# Patient Record
Sex: FEMALE | Race: WHITE | NOT HISPANIC OR LATINO | Employment: FULL TIME | URBAN - METROPOLITAN AREA
[De-identification: names, ages, dates, MRNs, and addresses within clinical notes are randomized per-mention and may not be internally consistent; named-entity substitution may affect disease eponyms.]

---

## 2021-10-10 ENCOUNTER — OCCUPATIONAL MEDICINE (OUTPATIENT)
Dept: URGENT CARE | Facility: CLINIC | Age: 61
End: 2021-10-10
Payer: COMMERCIAL

## 2021-10-10 ENCOUNTER — APPOINTMENT (OUTPATIENT)
Dept: RADIOLOGY | Facility: IMAGING CENTER | Age: 61
End: 2021-10-10
Attending: PHYSICIAN ASSISTANT
Payer: COMMERCIAL

## 2021-10-10 VITALS
WEIGHT: 103 LBS | TEMPERATURE: 98.2 F | SYSTOLIC BLOOD PRESSURE: 104 MMHG | BODY MASS INDEX: 17.58 KG/M2 | HEART RATE: 66 BPM | HEIGHT: 64 IN | OXYGEN SATURATION: 100 % | DIASTOLIC BLOOD PRESSURE: 56 MMHG | RESPIRATION RATE: 18 BRPM

## 2021-10-10 DIAGNOSIS — S62.639B OPEN FRACTURE OF TUFT OF DISTAL PHALANX OF FINGER: ICD-10-CM

## 2021-10-10 DIAGNOSIS — S69.91XA INJURY OF FINGER OF RIGHT HAND, INITIAL ENCOUNTER: ICD-10-CM

## 2021-10-10 DIAGNOSIS — S61.309A FINGERNAIL AVULSION, PARTIAL, INITIAL ENCOUNTER: ICD-10-CM

## 2021-10-10 DIAGNOSIS — S60.10XA SUBUNGUAL HEMATOMA OF FINGER OF RIGHT HAND, INITIAL ENCOUNTER: ICD-10-CM
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
## 2021-10-10 LAB
AMP AMPHETAMINE: NORMAL
BREATH ALCOHOL COMMENT: NORMAL
COC COCAINE: NORMAL
INT CON NEG: NORMAL
INT CON POS: NORMAL
MET METHAMPHETAMINES: NORMAL
OPI OPIATES: NORMAL
PCP PHENCYCLIDINE: NORMAL
POC BREATHALIZER: 0 PERCENT (ref 0–0.01)
POC DRUG COMMENT 753798-OCCUPATIONAL HEALTH: NEGATIVE
THC: NORMAL

## 2021-10-10 PROCEDURE — 82075 ASSAY OF BREATH ETHANOL: CPT | Mod: 29 | Performed by: PHYSICIAN ASSISTANT

## 2021-10-10 PROCEDURE — 99204 OFFICE O/P NEW MOD 45 MIN: CPT | Mod: 29 | Performed by: PHYSICIAN ASSISTANT

## 2021-10-10 PROCEDURE — 73140 X-RAY EXAM OF FINGER(S): CPT | Mod: TC,FY,RT | Performed by: PHYSICIAN ASSISTANT

## 2021-10-10 PROCEDURE — 80305 DRUG TEST PRSMV DIR OPT OBS: CPT | Mod: 29 | Performed by: PHYSICIAN ASSISTANT

## 2021-10-10 RX ORDER — AMOXICILLIN AND CLAVULANATE POTASSIUM 875; 125 MG/1; MG/1
1 TABLET, FILM COATED ORAL 2 TIMES DAILY
Qty: 14 TABLET | Refills: 0 | Status: SHIPPED | OUTPATIENT
Start: 2021-10-10 | End: 2021-10-17

## 2021-10-10 RX ORDER — ESTRADIOL 10 UG/1
TABLET VAGINAL
COMMUNITY
Start: 2021-09-28 | End: 2021-10-10

## 2021-10-10 RX ORDER — IBUPROFEN 200 MG
400 TABLET ORAL EVERY 6 HOURS PRN
COMMUNITY
End: 2021-12-08

## 2021-10-10 RX ORDER — ONDANSETRON 4 MG/1
TABLET, FILM COATED ORAL
COMMUNITY
Start: 2021-09-15 | End: 2021-10-10

## 2021-10-10 RX ORDER — FEXOFENADINE HCL 180 MG/1
180 TABLET ORAL DAILY
COMMUNITY
Start: 2021-03-05 | End: 2021-12-08

## 2021-10-10 RX ORDER — ESTRADIOL 0.1 MG/G
CREAM VAGINAL
COMMUNITY
Start: 2021-10-07 | End: 2021-12-08

## 2021-10-10 RX ORDER — MONTELUKAST SODIUM 10 MG/1
1 TABLET ORAL DAILY
COMMUNITY
Start: 2021-04-26 | End: 2021-10-10

## 2021-10-10 RX ORDER — OXYCODONE HYDROCHLORIDE 5 MG/1
TABLET ORAL
COMMUNITY
Start: 2021-09-15 | End: 2021-10-10

## 2021-10-10 RX ORDER — CONJUGATED ESTROGENS 0.62 MG/G
CREAM VAGINAL
COMMUNITY
Start: 2021-08-07 | End: 2021-10-10

## 2021-10-10 RX ORDER — ALBUTEROL SULFATE 90 UG/1
2 AEROSOL, METERED RESPIRATORY (INHALATION)
COMMUNITY
Start: 2021-03-05 | End: 2021-12-08

## 2021-10-10 ASSESSMENT — PAIN SCALES - GENERAL: PAINLEVEL: 5=MODERATE PAIN

## 2021-10-10 NOTE — PROGRESS NOTES
"Isabel Dumont is a 60 y.o. female who presents with Finger Injury (DOI: 10/10/2021, Rt middle finger, ring finger's nail is loose, finger got stuck with the door, swollen, bruised, painful)      DOI: 10/10/2021  60-year-old female presents to urgent care for evaluation injury to 3rd and 4th right side crushed in that occurred just prior to arrival at work.  She reports her fingers were crushed in a door.  Onset of pain, swelling, bruising to middle finger with small open wound to the side of her finger.  Patient's fingernail of fourth finger was partially avulsed.  Previous injury.  Patient is left-hand dominant.  She took 2 ibuprofen prior to arrival with some relief.     HPI    ROS           Objective     /56 (BP Location: Left arm, Patient Position: Sitting, BP Cuff Size: Adult)   Pulse 66   Temp 36.8 °C (98.2 °F) (Temporal)   Resp 18   Ht 1.626 m (5' 4\")   Wt 46.7 kg (103 lb)   SpO2 100%   Breastfeeding No   BMI 17.68 kg/m²      Physical Exam    Well-appearing and in no acute distress.  A & O by 4.  MSK: Right hand-swelling, bruising, and ecchymosis to distal third phalanges.  Less than 1 cm superficial laceration to lateral aspect of finger.  Subungual hematoma present seen under gel nail polish.   Fourth finger nail is partially avulsed with no bleeding.                   Assessment & Plan        1. Injury of finger of right hand, initial encounter  - DX-FINGER(S) 2+ RIGHT; Future    2. Open fracture of tuft of distal phalanx of finger  - amoxicillin-clavulanate (AUGMENTIN) 875-125 MG Tab; Take 1 Tablet by mouth 2 times a day for 7 days.  Dispense: 14 Tablet; Refill: 0    3. Subungual hematoma of finger of right hand, initial encounter    4. Fingernail avulsion, partial, initial encounter       -Limit use of right hand  -Wear finger splint while at work.  -Augmentin for prophylactic coverage of open fracture  -OTC ibuprofen for pain  -Rest, ice, elevate  -Monitor for signs of " infection  -Close follow-up in 2 days.  -Discussed return/ED precautions  -Patient verbalized understanding of treatment plan and has no further questions regarding care.

## 2021-10-10 NOTE — LETTER
"EMPLOYEE’S CLAIM FOR COMPENSATION/ REPORT OF INITIAL TREATMENT  FORM C-4    EMPLOYEE’S CLAIM - PROVIDE ALL INFORMATION REQUESTED   First Name  Valerie Last Name  Tim Birthdate                    1960                Sex  female Claim Number (Insurer’s Use Only)   Home Address  [address] Age  60 y.o. 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 (if applicable)  was following [employer] at glass door near gym&finger got caught on the door    How did this injury or occupational disease occur? (Be specific an answer in detail. Use additional sheet if necessary)  got my finger stuck in door   If you believe that you have an occupational disease, when did you first have knowledge of the disability and it relationship to your employment?  n/a Witnesses to the Accident  Security      Nature of Injury or Occupational Disease  Puncture  Part(s) of Body Injured or Affected  Finger (R), N/A, N/A    I certify that the above is true and correct to the best of my knowledge and that I have provided this " information in order to obtain the benefits of [place]  I hereby authorize any physician, chiropractor, surgeon, practitioner, or other person, any hospital, including [facility], any medical service organization, any insurance company, or other institution or organization to release to each other, any medical or other information, including benefits paid or payable, pertinent to this injury or disease, except information relative to diagnosis, treatment and/or counseling for AIDS, psychological conditions, alcohol or controlled substances, for which I must give specific authorization.  A Photostat of this authorization shall be as valid as the original.     Date 10/10/21   [facility]’s Original or  *Electronic Signature   THIS REPORT MUST BE COMPLETED AND MAILED WITHIN 3 WORKING DAYS OF TREATMENT   Place  [facility]  Name of Facility  [facility]   Date  10/10/2021 Diagnosis and Description of Injury or Occupational Disease  (S69.91XA) Injury of finger of right hand, initial encounter  (S62.639B) Open fracture of tuft of distal phalanx of finger  (S60.10XA) Subungual hematoma of finger of right hand, initial encounter  (S61.309A) Fingernail avulsion, partial, initial encounter Is there evidence the injured employee was under the influence of alcohol and/or another controlled substance at the time of accident?  ? No ? Yes (if yes, please explain)   Hour  11:03 AM   Diagnoses of Injury of finger of right hand, initial encounter, Open fracture of tuft of distal phalanx of finger, Subungual hematoma of finger of right hand, initial encounter, and Fingernail avulsion, partial, initial encounter were pertinent to this visit. No   Treatment  Exam, x-ray, evacuation of subungual hematoma, dressing, finger splint.  Have you advised the patient to remain  off work five days or     more?    X-Ray Findings  Positive  Comments:Fracture of distal third phalangeal tuft   ? Yes Indicate dates:   From   To      From information given by the employee, together with medical evidence, can        you directly connect this injury or occupational disease as job incurred?  Yes ? No If no, is the injured employee capable of:  ? full duty  No ? modified duty  Yes   Is additional medical care by a physician indicated?  Yes If Modified Duty, Specify any Limitations / Restrictions  See D 39   Do you know of any previous injury or disease contributing to this condition or occupational disease?  ? Yes ? No (Explain if yes)                          No   Date  10/10/2021 Print Health Care Provider's   Stefani Haddad P.A.-C. I certify the employer’s copy of  this form was mailed on:   [address] Insurer’s Use Only     [facility] Zip  [address]    Provider’s Tax ID Number  [id number] Telephone  Dept: [phone]             Health Care Provider’s Original or Electronic Signature  e-SignSTEFANI HADDAD P.A.-C. Degree (MD,DO, DC,PADavidC,APRN)  {Provider Degrees:29481}      * Complete and attach Release of Information (Form C-4A) when injured employee signs C-4 Form electronically  ORIGINAL - TREATING HEALTHCARE PROVIDER PAGE 2 - INSURER/TPA PAGE 3 - EMPLOYER PAGE 4 - EMPLOYEE             Form C-4 (rev.08/21)           BRIEF DESCRIPTION OF RIGHTS AND BENEFITS  (Pursuant to [place]RS 616C.050)    Notice of Injury or Occupational Disease (Incident Report Form C-1): If an injury or occupational disease (OD) arises out of and in the course of employment, you must provide written notice to your employer as soon as practicable, but no later than 7 days after the accident or OD. Your employer shall maintain a sufficient supply of the required forms.    Claim for Compensation (Form C-4): If medical treatment is sought, the form C-4 is available at the place of  "initial treatment. A completed \"Claim for Compensation\" (Form C-4) must be filed within 90 days after an accident or OD. The treating physician or chiropractor must, within 3 working days after treatment, complete and mail to the employer, the employer's insurer and third-party , the Claim for Compensation.    Medical Treatment: If you require medical treatment for your on-the-job injury or OD, you may be required to select a physician or chiropractor from a list provided by your workers’ compensation insurer, if it has contracted with an Organization for Managed Care (MCO) or Preferred Provider Organization (PPO) or providers of health care. If your employer has not entered into a contract with an MCO or PPO, you may select a physician or chiropractor from the Panel of Physicians and Chiropractors. Any medical costs related to your industrial injury or OD will be paid by your insurer.    Temporary Total Disability (TTD): If your doctor has certified that you are unable to work for a period of at least 5 consecutive days, or 5 cumulative days in a 20-day period, or places restrictions on you that your employer does not accommodate, you may be entitled to TTD compensation.    Temporary Partial Disability (TPD): If the wage you receive upon reemployment is less than the compensation for TTD to which you are entitled, the insurer may be required to pay you TPD compensation to make up the difference. TPD can only be paid for a maximum of 24 months.    Permanent Partial Disability (PPD): When your medical condition is stable and there is an indication of a PPD as a result of your injury or OD, within 30 days, your insurer must arrange for an evaluation by a rating physician or chiropractor to determine the degree of your PPD. The amount of your PPD award depends on the date of injury, the results of the PPD evaluation, your age and wage.    Permanent Total Disability (PTD): If you are medically certified by " a treating physician or chiropractor as permanently and totally disabled and have been granted a PTD status by your insurer, you are entitled to receive monthly benefits not to exceed 66 2/3% of your average monthly wage. The amount of your PTD payments is subject to reduction if you previously received a lump-sum PPD award.    Vocational Rehabilitation Services: You may be eligible for vocational rehabilitation services if you are unable to return to the job due to a permanent physical impairment or permanent restrictions as a result of your injury or occupational disease.    Transportation and Per Ridge Reimbursement: You may be eligible for travel expenses and per ridge associated with medical treatment.    Reopening: You may be able to reopen your claim if your condition worsens after claim closure.     Appeal Process: If you disagree with a written determination issued by the insurer or the insurer does not respond to your request, you may appeal to the Department of Administration, , by following the instructions contained in your determination letter. You must appeal the determination within 70 days from the date of the determination letter at [address], [address], [place] [address], or [address], [place] [address]. If you disagree with the  decision, you may appeal to the Department of Administration, . You must file your appeal within 30 days from the date of the  decision letter at [address], [address], [place] [address], or [address], [place] [address]. If you disagree with a decision of an , you may file a petition for judicial review with the District Court. You must do so within 30 days of the Appeal Officer’s decision. You may be represented by an  at your own expense or you may contact the [facility] for possible  representation.    [place]  for Injured Workers (NAIW): If you disagree with a  decision, you may request that NAIW represent you without charge at an  Hearing. For information regarding denial of benefits, you may contact the NAIW at: [address], [address], [place] [address], [phone], or [address], [address], [place] [address], [phone]    To File a Complaint with the Division: If you wish to file a complaint with the  of the Division of Industrial Relations (DIR),  please contact the Workers’ Compensation Section, [address], [address], [place], [place] [address], telephone [phone], or [address], [address], [place], [place] [address], telephone [phone].    For assistance with Workers’ Compensation Issues: You may contact the [facility] Office for Consumer Health Assistance, [address], [address], [place], [place] [address], Toll Free 1-760.714.6361, Web site: http://[facility].[place].gov/Programs/KURT E-mail: [email or web address]              __________________________________________________________________                                    _________________            Employee Name / Signature                                                                                                                            Date                                                                                                                                                                                                                              D-2 (rev. 10/20)

## 2021-10-10 NOTE — LETTER
[facility]  [address] [place] [address]  Phone:  [phone] - Fax:  [phone]   [facility] Progress Report and Disability Certification  Date of Service: 10/10/2021   No Show:  No  Date / Time of Next Visit: 10/12/2021   Claim Information   Patient Name: Valerie Dumont  Claim Number:     Employer:   [employer] Date of Injury: 10/10/2021     Insurer / TPA:   [employer] ID / SSN:     Occupation:   Diagnosis: Diagnoses of Injury of finger of right hand, initial encounter, Open fracture of tuft of distal phalanx of finger, Subungual hematoma of finger of right hand, initial encounter, and Fingernail avulsion, partial, initial encounter were pertinent to this visit.    Medical Information   Related to Industrial Injury? Yes    Subjective Complaints:  DOI: 10/10/2021  60-year-old female presents to urgent care for evaluation injury to 3rd and 4th right side crushed in that occurred just prior to arrival at work.  She reports her fingers were crushed in a door.  Onset of pain, swelling, bruising to middle finger with small open wound to the side of her finger.  Patient's fingernail of fourth finger was partially avulsed.  Previous injury.  Patient is left-hand dominant.  She took 2 ibuprofen prior to arrival with some relief.   Objective Findings: Well-appearing and in no acute distress.  A & O by 4.  MSK: Right hand-swelling, bruising, and ecchymosis to distal third phalanges.  Less than 1 cm superficial laceration to lateral aspect of finger.  Subungual hematoma present seen under gel nail polish.   Fourth finger nail is partially avulsed with no bleeding.   Pre-Existing Condition(s):     Assessment:   Initial Visit    Status: Additional Care Required  Permanent Disability:No    Plan: Medication  Comments:Augmentin, OTC ibuprofen    Diagnostics: X-ray  Comments:Nondisplaced fracture to distal tuft of third right finger    Comments:   Procedure: Evacuation Subungual Hematoma  -risks, benefits, and alternatives to procedure discussed  -clean technique using sterile instruments  -1% lidocaine to anesthetize finger  -Cautery used to bore a hole in the nail with, no release of blood,   improvement in pain  -patient tolerated well      Disability Information   Status: Released to Restricted Duty    From:  10/10/2021  Through: 10/12/2021 Restrictions are: Temporary   Physical Restrictions   Sitting:    Standing:    Stooping:    Bending:      Squatting:    Walking:    Climbing:    Pushing:      Pulling:    Other:    Reaching Above Shoulder (L):   Reaching Above Shoulder (R):       Reaching Below Shoulder (L):    Reaching Below Shoulder (R):      Not to exceed Weight Limits   Carrying(hrs):   Weight Limit(lb):   Lifting(hrs):   Weight  Limit(lb):     Comments: -Limit use of right hand  -Wear finger splint while at work.  -Augmentin for prophylactic coverage of open fracture  -OTC ibuprofen for pain  -Rest, ice, elevate  -Monitor for signs of infection  -Close follow-up in 2 days.  -Discussed return/ED precautions  -Patient verbalized understanding of treatment plan and has no further questions regarding care.     Repetitive Actions   Hands: i.e. Fine Manipulations from Grasping:     Feet: i.e. Operating Foot Controls:     Driving / Operate Machinery:     Health Care Provider’s Original or Electronic Signature  SILVIANO Day.CHIN. Health Care Provider’s Original or Electronic Signature    Charlie Lugo MD         Clinic Name / Location: [address] [place] [address] Clinic Phone Number: Dept: [phone]   Appointment Time: 11:00 Am Visit Start Time: 11:03 AM   Check-In Time:  11:00 Am Visit Discharge Time:  ***   Original-Treating Physician or Chiropractor    Page 2-Insurer/TPA    Page 3-Employer    Page 4-Employee

## 2021-10-12 ENCOUNTER — OCCUPATIONAL MEDICINE (OUTPATIENT)
Dept: URGENT CARE | Facility: CLINIC | Age: 61
End: 2021-10-12
Payer: COMMERCIAL

## 2021-10-12 VITALS
HEART RATE: 56 BPM | RESPIRATION RATE: 18 BRPM | OXYGEN SATURATION: 97 % | SYSTOLIC BLOOD PRESSURE: 96 MMHG | TEMPERATURE: 99.2 F | WEIGHT: 105 LBS | BODY MASS INDEX: 17.93 KG/M2 | HEIGHT: 64 IN | DIASTOLIC BLOOD PRESSURE: 54 MMHG

## 2021-10-12 DIAGNOSIS — S60.10XD SUBUNGUAL HEMATOMA OF FINGERNAIL, SUBSEQUENT ENCOUNTER: ICD-10-CM

## 2021-10-12 DIAGNOSIS — S62.639B OPEN FRACTURE OF TUFT OF DISTAL PHALANX OF FINGER: ICD-10-CM

## 2021-10-12 PROCEDURE — 99214 OFFICE O/P EST MOD 30 MIN: CPT | Performed by: PHYSICIAN ASSISTANT

## 2021-10-12 ASSESSMENT — ENCOUNTER SYMPTOMS
TINGLING: 1
SENSORY CHANGE: 0
JOINT SWELLING: 1

## 2021-10-12 NOTE — PROGRESS NOTES
"Isabel Dumont is a 60 y.o. female who presents with Follow-Up (R 3rd finger )      DOI: 10/10/21-patient returns today continuing to take Augmentin along with ibuprofen-she also reports she is compliant with her finger splint.  Yesterday she reports that her finger was\" so much pain \"and bleeding more than usual of which she was unable to work.  She continues to report pain and throbbing to the distal portion of the fingernail. Denies any new numbness or tingling.   Copied from original visit- 60-year-old female presents to urgent care for evaluation injury to 3rd and 4th right side crushed in that occurred just prior to arrival at work.  She reports her fingers were crushed in a door.  Onset of pain, swelling, bruising to middle finger with small open wound to the side of her finger.        Hand Injury  This is a new (This provider) problem. Episode onset: 10/10. The problem occurs constantly. The problem has been unchanged. Associated symptoms include joint swelling. Exacerbated by: Pressure over the finger. Treatments tried: As above.       Review of Systems   Musculoskeletal: Positive for joint pain and joint swelling.   Neurological: Positive for tingling. Negative for sensory change.   All other systems reviewed and are negative.             Objective     BP (!) 96/54   Pulse (!) 56   Temp 37.3 °C (99.2 °F) (Temporal)   Resp 18   Ht 1.626 m (5' 4\")   Wt 47.6 kg (105 lb)   SpO2 97%   Breastfeeding No   BMI 18.02 kg/m²      PMH: No pertinent past medical history to this problem  MEDS: Medications were reviewed in Epic  ALLERGIES: Allergies were reviewed in Epic  SOCHX: Works as a  for small  FH: No pertinent family history to this problem    Physical Exam  Vitals reviewed.   Constitutional:       General: She is not in acute distress.     Appearance: She is well-developed.   HENT:      Head: Normocephalic and atraumatic.   Eyes:      Conjunctiva/sclera: Conjunctivae normal.      " Pupils: Pupils are equal, round, and reactive to light.   Neck:      Trachea: No tracheal deviation.   Cardiovascular:      Rate and Rhythm: Normal rate.   Pulmonary:      Effort: Pulmonary effort is normal.   Musculoskeletal:      Cervical back: Normal range of motion and neck supple.   Skin:     General: Skin is warm.      Comments: No rash to area exposed during the visit today.    Neurological:      Mental Status: She is alert and oriented to person, place, and time.   Psychiatric:         Behavior: Behavior normal.         Thought Content: Thought content normal.         Judgment: Judgment normal.         Right hand: Third digit-radial aspect of the distal portion of the digit with small scab noted with dried blood diffuse ecchymosis along with erythema to the distal tip palmar aspect with significant tenderness.  Gel is on top of current fingernail-small portion of fingernail is revealed indicating subungual hematoma.                   Assessment & Plan        1. Open fracture of tuft of distal phalanx of finger  2. Subungual hematoma of fingernail, subsequent encounter           Continue with light duty, complete duration of Augmentin and utilization of ibuprofen.  Recheck in 1 week or sooner for worsening symptoms.  Please see D 39 is oh thoughtfor more information.     Please note that this dictation was created using voice recognition software. I have made every reasonable attempt to correct obvious errors, but I expect that there are errors of grammar and possibly content that I did not discover before finalizing the note.

## 2021-10-12 NOTE — LETTER
"   [facility]  [address] - [place] [address]  Phone:  [phone] - Fax:  [phone]   [facility] Progress Report and Disability Certification  Date of Service: 10/12/2021   No Show:  No  Date / Time of Next Visit: 10/19/2021   Claim Information   Patient Name: Valerie Dumont  Claim Number:     Employer:   [employer] Date of Injury: 10/10/2021     Insurer / TPA:   [employer] ID / SSN:     Occupation:   Diagnosis: Diagnoses of Open fracture of tuft of distal phalanx of finger and Subungual hematoma of fingernail, subsequent encounter were pertinent to this visit.    Medical Information   Related to Industrial Injury? Yes    Subjective Complaints:  DOI: 10/10/21-patient returns today continuing to take Augmentin along with ibuprofen-she also reports she is compliant with her finger splint.  Yesterday she reports that her finger was\" so much pain \"and bleeding more than usual of which she was unable to work.  She continues to report pain and throbbing to the distal portion of the fingernail. Denies any new numbness or tingling.   Copied from original visit- 60-year-old female presents to urgent care for evaluation injury to 3rd and 4th right side crushed in that occurred just prior to arrival at work.  She reports her fingers were crushed in a door.  Onset of pain, swelling, bruising to middle finger with small open wound to the side of her finger.      Objective Findings: Right hand: Third digit-radial aspect of the distal portion of the digit with small scab noted with dried blood diffuse ecchymosis along with erythema to the distal tip palmar aspect with significant tenderness.  Gel is on top of current fingernail-small portion of fingernail is revealed indicating subungual hematoma.   Pre-Existing Condition(s):     Assessment:   Condition Same    Status: Additional Care Required  Permanent Disability:No    Plan: " Medication  Comments:Augmentin/Ibuprofen.     Diagnostics: X-ray  Comments:Finger x-ray: Tuft fracture noted.    Comments:       Disability Information   Status: Released to Restricted Duty    From:  10/12/2021  Through: 10/19/2021 Restrictions are: Temporary   Physical Restrictions   Sitting:    Standing:    Stooping:    Bending:      Squatting:    Walking:    Climbing:    Pushin hrs/day   Pullin hrs/day Other:    Reaching Above Shoulder (L):   Reaching Above Shoulder (R):       Reaching Below Shoulder (L):    Reaching Below Shoulder (R):      Not to exceed Weight Limits   Carrying(hrs):   Weight Limit(lb): < or = to 10 pounds Lifting(hrs):   Weight  Limit(lb): < or = to 10 pounds   Comments: Patient is to continue to wear splint at work, limitations on pushing, pulling and lifting.  She is to continue to keep the area clean and dry.  Continue with Augmentin along with over-the-counter NSAIDs.  Return to clinic in 1 week for recheck or sooner for worsening symptoms.    Repetitive Actions   Hands: i.e. Fine Manipulations from Grasping:     Feet: i.e. Operating Foot Controls:     Driving / Operate Machinery:     Health Care Provider’s Original or Electronic Signature  ZHAO GilmoreAROSENDO. Health Care Provider’s Original or Electronic Signature    Charlie Lugo MD         Clinic Name / Location: [address] [place] [address] Clinic Phone Number: Dept: [phone]   Appointment Time: 8:00 Am Visit Start Time: 8:16 AM   Check-In Time:  8:07 Am Visit Discharge Time:  8:44 AM   Original-Treating Physician or Chiropractor    Page 2-Insurer/TPA    Page 3-Employer    Page 4-Employee

## 2021-10-12 NOTE — LETTER
October 12, 2021         Patient: Valerie Dumont   YOB: 1960   Date of Visit: 10/12/2021           To Whom it May Concern:    Valerie Dumont was seen in my clinic on 10/12/2021. Please excuse this patient from work yesterday due to increased pain and bleeding from wound.  If you have any questions or concerns, please don't hesitate to call.        Sincerely,           Valentín Wheeler P.A.-C.  Electronically Signed

## 2021-10-20 ENCOUNTER — OCCUPATIONAL MEDICINE (OUTPATIENT)
Dept: URGENT CARE | Facility: CLINIC | Age: 61
End: 2021-10-20
Payer: COMMERCIAL

## 2021-10-20 VITALS
WEIGHT: 105 LBS | OXYGEN SATURATION: 99 % | HEART RATE: 62 BPM | HEIGHT: 64 IN | RESPIRATION RATE: 12 BRPM | BODY MASS INDEX: 17.93 KG/M2 | TEMPERATURE: 98.1 F | DIASTOLIC BLOOD PRESSURE: 58 MMHG | SYSTOLIC BLOOD PRESSURE: 98 MMHG

## 2021-10-20 DIAGNOSIS — S62.662D CLOSED NONDISPLACED FRACTURE OF DISTAL PHALANX OF RIGHT MIDDLE FINGER WITH ROUTINE HEALING, SUBSEQUENT ENCOUNTER: ICD-10-CM

## 2021-10-20 DIAGNOSIS — S60.10XD SUBUNGUAL HEMATOMA OF FINGERNAIL, SUBSEQUENT ENCOUNTER: ICD-10-CM
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
## 2021-10-20 PROCEDURE — 99214 OFFICE O/P EST MOD 30 MIN: CPT | Performed by: NURSE PRACTITIONER

## 2021-10-20 RX ORDER — CLOTRIMAZOLE 1 G/ML
SOLUTION TOPICAL
COMMUNITY
Start: 2021-10-15 | End: 2021-12-08

## 2021-10-20 ASSESSMENT — ENCOUNTER SYMPTOMS
FEVER: 0
HEADACHES: 0
NAUSEA: 0
CHILLS: 0

## 2021-10-20 ASSESSMENT — LIFESTYLE VARIABLES: SUBSTANCE_ABUSE: 0

## 2021-10-20 NOTE — LETTER
[facility] [address] [place] [address]  Phone:  [phone] - Fax:  [phone]   [facility] Progress Report and Disability Certification  Date of Service: 10/20/2021   No Show:  No  Date / Time of Next Visit: 11/3/2021   Claim Information   Patient Name: Valerie Dumont  Claim Number:     Employer:   [employer] Date of Injury: 10/10/2021     Insurer / TPA: [employer] ID / SSN:     Occupation:   Diagnosis: There were no encounter diagnoses.    Medical Information   Related to Industrial Injury? Yes    Subjective Complaints:  DOI 10/10/2021 @ 10:05   Today still feeling much pressure under her nail. It is swelling up causing pressure, throbbing pain at time. She wears splint prn pain. Working at 95% duty without difficulty. Has not had to take medication for pain. Still has limited ROM to finger.  No other aggravating or alleviating factors.      Objective Findings: VSS. Limited ROM to right 3rd finger. + subungual hematoma of nail with appearance of increased pressure at base of nailbed. Drainage of hematoma done. Pt tolerated well.  Dressing applied.      Pre-Existing Condition(s):     Assessment:   Condition Improved    Status: Additional Care Required  Permanent Disability:No    Plan:      Diagnostics:      Comments:       Disability Information   Status:      From:  10/20/2021  Through: 11/3/2021 Restrictions are: Temporary   Physical Restrictions   Sitting:    Standing:    Stooping:    Bending:      Squatting:    Walking:    Climbing:    Pushing:      Pulling:    Other:    Reaching Above Shoulder (L):   Reaching Above Shoulder (R):       Reaching Below Shoulder (L):    Reaching Below Shoulder (R):      Not to exceed Weight Limits   Carrying(hrs):   Weight Limit(lb):   Lifting(hrs):   Weight  Limit(lb):     Comments: Finger splint prn   Limited activity if it involves right 3rd finger.   OTC  analgesic of choice (acetaminophen  or NSAID). Follow manufactures dosing and safety precautions.       Repetitive Actions   Hands: i.e. Fine Manipulations from Grasping:     Feet: i.e. Operating Foot Controls:     Driving / Operate Machinery:     Health Care Provider’s Original or Electronic Signature  ALLY Nassar. Health Care Provider’s Original or Electronic Signature    Charlie Lugo MD         Clinic Name / Location: [address]  [place] [address] Clinic Phone Number: Dept: [phone]   Appointment Time: 8:45 Am Visit Start Time: 8:35 AM   Check-In Time:  8:23 Am Visit Discharge Time:  9:14 AM   Original-Treating Physician or Chiropractor    Page 2-Insurer/TPA    Page 3-Employer    Page 4-Employee

## 2021-10-20 NOTE — PROGRESS NOTES
"Isabel Dumont is a 60 y.o. female who presents with Follow-Up (R 3rd finger injury, WC F/V )           HPI DOI 10/10/2021 @ 10:05   Today still feeling much pressure under her nail. It is swelling up causing pressure, throbbing pain at time. She wears splint prn pain. Working at 95% duty without difficulty. Has not had to take medication for pain. Still has limited ROM to finger.  No other aggravating or alleviating factors.     Review of Systems   Constitutional: Negative for chills and fever.   Gastrointestinal: Negative for nausea.   Musculoskeletal: Positive for joint pain.   Neurological: Negative for headaches.   Endo/Heme/Allergies: Negative for environmental allergies.   Psychiatric/Behavioral: Negative for substance abuse.              Objective     BP (!) 98/58   Pulse 62   Temp 36.7 °C (98.1 °F) (Temporal)   Resp 12   Ht 1.626 m (5' 4\")   Wt 47.6 kg (105 lb)   SpO2 99%   BMI 18.02 kg/m²      Physical Exam  Vitals and nursing note reviewed.   Constitutional:       General: She is not in acute distress.     Appearance: Normal appearance. She is normal weight.   Cardiovascular:      Rate and Rhythm: Normal rate.      Pulses: Normal pulses.   Skin:     General: Skin is warm.      Capillary Refill: Capillary refill takes less than 2 seconds.   Neurological:      Mental Status: She is alert and oriented to person, place, and time.   Psychiatric:         Mood and Affect: Mood normal.         Behavior: Behavior normal.         Thought Content: Thought content normal.         VSS. Limited ROM to right 3rd finger. + subungual hematoma of nail with appearance of increased pressure at base of nailbed. Drainage of hematoma done. Pt tolerated well.  Dressing applied.        Procedure: Needle Incision and Drainage   -Risks, benefits, and alternatives discussed. Risks including infection, bleeding, nerve damage, and poor cosmetic outcome. Informed consent obtained.    -Sterile technique throughout "   -The area was prepped in the usual manner  -Incision with 18 ga needle  With bloody material expressed   -Irrigated copiously with NS   -Minimal bleeding with good hemostasis achieved   -The patient tolerated the procedure well               Xray: Reviewed  From previous visit and interpreted independently by me. I agree with the radiologist's findings.        Assessment & Plan        1. Closed nondisplaced fracture of distal phalanx of right middle finger with routine healing, subsequent encounter     2. Subungual hematoma of fingernail, subsequent encounter     3. Work related injury         See [place] D39    FU 2 weeks     Consider xray at next visit to see routine healing   Drainage of subungual hematoma.

## 2021-11-04 ENCOUNTER — OCCUPATIONAL MEDICINE (OUTPATIENT)
Dept: URGENT CARE | Facility: CLINIC | Age: 61
End: 2021-11-04
Payer: COMMERCIAL

## 2021-11-04 VITALS
OXYGEN SATURATION: 98 % | RESPIRATION RATE: 18 BRPM | WEIGHT: 105 LBS | HEART RATE: 88 BPM | HEIGHT: 64 IN | TEMPERATURE: 98.2 F | SYSTOLIC BLOOD PRESSURE: 104 MMHG | BODY MASS INDEX: 17.93 KG/M2 | DIASTOLIC BLOOD PRESSURE: 66 MMHG

## 2021-11-04 DIAGNOSIS — S62.662D CLOSED NONDISPLACED FRACTURE OF DISTAL PHALANX OF RIGHT MIDDLE FINGER WITH ROUTINE HEALING, SUBSEQUENT ENCOUNTER: ICD-10-CM

## 2021-11-04 DIAGNOSIS — S60.10XD SUBUNGUAL HEMATOMA OF FINGERNAIL, SUBSEQUENT ENCOUNTER: ICD-10-CM

## 2021-11-04 DIAGNOSIS — L03.011 PARONYCHIA OF RIGHT MIDDLE FINGER: ICD-10-CM

## 2021-11-04 PROCEDURE — 99214 OFFICE O/P EST MOD 30 MIN: CPT | Performed by: PHYSICIAN ASSISTANT

## 2021-11-04 RX ORDER — CEPHALEXIN 500 MG/1
500 CAPSULE ORAL 4 TIMES DAILY
Qty: 28 CAPSULE | Refills: 0 | Status: SHIPPED | OUTPATIENT
Start: 2021-11-04 | End: 2021-11-11

## 2021-11-04 ASSESSMENT — ENCOUNTER SYMPTOMS
VOMITING: 0
FEVER: 0
NAUSEA: 0
EYE DISCHARGE: 0
EYE REDNESS: 0

## 2021-11-04 NOTE — PROGRESS NOTES
"Isabel Dumont is a 61 y.o. female who presents with Finger Injury (RIGHT 3rd finger, now painful, swollen, \"pulsing\" )            HPI  The patient presents to clinic for Workmen's Comp. follow-up.    DOI: 10/10/2021 -copied from original visit- \"60-year-old female presents to urgent care for evaluation injury to 3rd and 4th right side crushed in that occurred just prior to arrival at work.  She reports her fingers were crushed in a door.  Onset of pain, swelling, bruising to middle finger with small open wound to the side of her finger.  Patient's fingernail of fourth finger was partially avulsed.  Previous injury.  Patient is left-hand dominant.  She took 2 ibuprofen prior to arrival with some relief.\"    Follow-up #4 - Today, the patient reports increasing pain to her right third finger.  The patient states over the past 2-3 days she has developed increasing pain to her right third finger, which she describes as throbbing/pulsating.  The patient also reports associated swelling and redness.  The patient notes intermittent bleeding from the nail bed.  She reports no additional discharge/drainage.  No fever.  The patient states she has continued to wear her splint as directed.  The patient admits that they are short staffed at work, and that she has been doing more with her right hand.  The patient also admits that she has gotten her injury wet several times.  The patient continues to take OTC ibuprofen.    PMH: Reviewed in Epic, no pertinent findings.  MEDS: Reviewed in Epic.  ALLERGIES: Reviewed in Epic.  SURGHX: Reviewed in Epic.  SOCHX: Reviewed in Epic.  FH: Family history was reviewed, no pertinent findings to report.        Review of Systems   Constitutional: Negative for fever.   HENT: Negative for congestion.    Eyes: Negative for discharge and redness.   Gastrointestinal: Negative for nausea and vomiting.   Musculoskeletal: Positive for joint pain.        + injury to right 3rd digit         " "      Objective     /66   Pulse 88   Temp 36.8 °C (98.2 °F) (Temporal)   Resp 18   Ht 1.626 m (5' 4\")   Wt 47.6 kg (105 lb)   SpO2 98%   BMI 18.02 kg/m²      Physical Exam  Constitutional:       General: She is not in acute distress.     Appearance: Normal appearance. She is well-developed. She is not ill-appearing.   HENT:      Head: Normocephalic and atraumatic.      Right Ear: External ear normal.      Left Ear: External ear normal.   Eyes:      Extraocular Movements: Extraocular movements intact.      Conjunctiva/sclera: Conjunctivae normal.   Cardiovascular:      Rate and Rhythm: Normal rate.   Pulmonary:      Effort: Pulmonary effort is normal.   Musculoskeletal:      Cervical back: Normal range of motion and neck supple.      Comments:   Right 3rd Digit:  Tenderness to the distal aspect of the right third digit with associated swelling, overlying erythema, and increased warmth.  No active discharge/drainage.   A subungual hematoma is present to the fingernail of the right third digit.  No active bleeding.  No palpable fluctuance to the nail folds.  Decreased ROM -the patient demonstrates limited range of motion of the right third digit  Neurovascular intact distally  Strength 5/5 -flexion/extension of the right third digit against resistance   Skin:     General: Skin is warm and dry.   Neurological:      Mental Status: She is alert and oriented to person, place, and time.                             Assessment & Plan          1. Closed nondisplaced fracture of distal phalanx of right middle finger with routine healing, subsequent encounter  - Referral to Occupational Medicine  - Referral to Sports Medicine    2. Subungual hematoma of fingernail, subsequent encounter  - cephALEXin (KEFLEX) 500 MG Cap; Take 1 Capsule by mouth 4 times a day for 7 days.  Dispense: 28 Capsule; Refill: 0    3. Paronychia of right middle finger  - cephALEXin (KEFLEX) 500 MG Cap; Take 1 Capsule by mouth 4 times a day for " 7 days.  Dispense: 28 Capsule; Refill: 0    The patient's presenting symptoms and physical exam findings are consistent with a subacute injury injury to the right third digit resulting in a nondisplaced fracture of the distal phalanx of the right middle finger and a subungual hematoma.  The patient appears to have developed a secondary infection.  Will prescribe the patient Keflex for her acute paronychia.  Advised patient to monitor worsening signs and/or symptoms.  Placed referral to occupational medicine and sports medicine for further evaluation management.  Recommend OTC medications and supportive care for symptomatic management.  Discussed return precautions with the patient, and she verbalized understanding.    Differential diagnoses, supportive care, and indications for immediate follow-up discussed with patient.   Instructed to return to clinic or nearest emergency department for any change in condition, further concerns, or worsening of symptoms.    Plan:  Light Duty Work Restrictions - D39 provided   Keflex as prescribed   OTC NSAIDs for pain/discomfort   RICE  Keep finger clean and dry  Wear splint for additional support/stabilization  Referral to Occupational Medicine   Referral to Sports Medicine   Follow-up with PCP   Return to clinic or go tot he ED if symptoms worsen or fail to improve, or if the patient should develop worsening/increasing pain/tenderness, swelling, bruising, redness or warmth to the affected area, decreased ROM, numbness, tingling or weakness, difficulty walking, fever/chills, and/or any concerning symptoms.     Discussed plan with the patient, and she agrees to the above.     I personally reviewed prior external notes and test results pertinent to today's visit.  I have independently reviewed and interpreted all diagnostics ordered during this urgent care visit.     Time spent evaluating this patient was at least 30 minutes and includes preparing for visit, obtaining history, exam  and evaluation, ordering labs/tests/procedures/medications, independent interpretation, and counseling/education.    Please note that this dictation was created using voice recognition software. I have made every reasonable attempt to correct obvious errors, but I expect that there may be errors of grammar and possibly content that I did not discover before finalizing the note.     This note was electronically signed by Jenn Wang PA-C

## 2021-11-04 NOTE — LETTER
"   [facility]  [address] - [place] [address]  Phone:  [phone] - Fax:  [phone]   [facility] Progress Report and Disability Certification  Date of Service: 11/4/2021   No Show:  No  Date / Time of Next Visit: 11/11/2021   Claim Information   Patient Name: Valerie Dumont  Claim Number:     Employer:   [employer] Date of Injury: 10/10/2021     Insurer / TPA: [employer]  ID / SSN:     Occupation:   Diagnosis: Diagnoses of Closed nondisplaced fracture of distal phalanx of right middle finger with routine healing, subsequent encounter, Subungual hematoma of fingernail, subsequent encounter, and Paronychia of right middle finger were pertinent to this visit.    Medical Information   Related to Industrial Injury?   No   Subjective Complaints:    The patient presents to clinic for Workmen's Comp. follow-up.    DOI: 10/10/2021 -copied from original visit- \"60-year-old female presents to urgent care for evaluation injury to 3rd and 4th right side crushed in that occurred just prior to arrival at work.  She reports her fingers were crushed in a door.  Onset of pain, swelling, bruising to middle finger with small open wound to the side of her finger.  Patient's fingernail of fourth finger was partially avulsed.  Previous injury.  Patient is left-hand dominant.  She took 2 ibuprofen prior to arrival with some relief.\"    Follow-up #4 - Today, the patient reports increasing pain to her right third finger.  The patient states over the past 2-3 days she has developed increasing pain to her right third finger, which she describes as throbbing/pulsating.  The patient also reports associated swelling and redness.  The patient notes intermittent bleeding from the nail bed.  She reports no additional discharge/drainage.  No fever.  The patient states she has continued to wear her splint as directed.  The patient admits that they are short staffed at " work, and that she has been doing more with her right hand.  The patient also admits that she has gotten her injury wet several times.  The patient continues to take OTC ibuprofen.   Objective Findings:   Right 3rd Digit:  Tenderness to the distal aspect of the right third digit with associated swelling, overlying erythema, and increased warmth.  No active discharge/drainage.   A subungual hematoma is present to the fingernail of the right third digit.  No active bleeding.  No palpable fluctuance to the nail folds.  Decreased ROM -the patient demonstrates limited range of motion of the right third digit  Neurovascular intact distally  Strength 5/5 -flexion/extension of the right third digit against resistance   Pre-Existing Condition(s):     Assessment:   Condition Worsened    Status: Additional Care Required  Permanent Disability:     Plan: MedicationTransfer Care    Diagnostics:      Comments:       Disability Information   Status: Released to Restricted Duty    From:  11/4/2021  Through: 11/11/2021 Restrictions are: Temporary   Physical Restrictions   Sitting:    Standing:    Stooping:    Bending:      Squatting:    Walking:    Climbing:    Pushing:      Pulling:    Other:    Reaching Above Shoulder (L):   Reaching Above Shoulder (R):       Reaching Below Shoulder (L):    Reaching Below Shoulder (R):      Not to exceed Weight Limits   Carrying(hrs):   Weight Limit(lb): < or = to 10 pounds Lifting(hrs):   Weight  Limit(lb): < or = to 10 pounds   Comments:   ** limited use of right hand; wear splint at all time; keep right 3rd digit clean and dry; no submerging right hand in water; antibiotics as prescribed   Plan:  Light Duty Work Restrictions - D39 provided   Keflex as prescribed   OTC NSAIDs for pain/discomfort   RICE  Keep finger clean and dry  Wear splint for additional support/stabilization  Referral to Occupational Medicine   Referral to Sports Medicine   Follow-up with PCP   Return to clinic or go tot  ED  if symptoms worsen or fail to improve, or if the patient should develop worsening/increasing pain/tenderness, swelling, bruising, redness or warmth to the affected area, decreased ROM, numbness, tingling or weakness, difficulty walking, fever/chills, and/or any concerning symptoms.     Repetitive Actions   Hands: i.e. Fine Manipulations from Grasping:     Feet: i.e. Operating Foot Controls:     Driving / Operate Machinery:     Health Care Provider’s Original or Electronic Signature  Jenn Wang P.A.-C. Health Care Provider’s Original or Electronic Signature    Charlie Lugo MD         Clinic Name / Location: [address]  [place] [address] Clinic Phone Number: Dept: [phone]   Appointment Time: 9:15 Am Visit Start Time: 9:34 AM   Check-In Time:  9:06 Am Visit Discharge Time:  1015   Original-Treating Physician or Chiropractor    Page 2-Insurer/TPA    Page 3-Employer    Page 4-Employee

## 2021-12-08 ENCOUNTER — OCCUPATIONAL MEDICINE (OUTPATIENT)
Dept: URGENT CARE | Facility: CLINIC | Age: 61
End: 2021-12-08
Payer: COMMERCIAL

## 2021-12-08 ENCOUNTER — APPOINTMENT (OUTPATIENT)
Dept: RADIOLOGY | Facility: IMAGING CENTER | Age: 61
End: 2021-12-08
Attending: NURSE PRACTITIONER
Payer: COMMERCIAL

## 2021-12-08 VITALS
HEART RATE: 68 BPM | OXYGEN SATURATION: 97 % | WEIGHT: 105 LBS | HEIGHT: 64 IN | RESPIRATION RATE: 12 BRPM | SYSTOLIC BLOOD PRESSURE: 102 MMHG | TEMPERATURE: 99.5 F | BODY MASS INDEX: 17.93 KG/M2 | DIASTOLIC BLOOD PRESSURE: 62 MMHG

## 2021-12-08 DIAGNOSIS — S62.662D OPEN NONDISPLACED FRACTURE OF DISTAL PHALANX OF RIGHT MIDDLE FINGER WITH ROUTINE HEALING, SUBSEQUENT ENCOUNTER: ICD-10-CM
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
## 2021-12-08 DIAGNOSIS — S69.91XA INJURY TO FINGERNAIL OF RIGHT HAND, INITIAL ENCOUNTER: ICD-10-CM

## 2021-12-08 PROCEDURE — 73140 X-RAY EXAM OF FINGER(S): CPT | Mod: TC,FY,RT | Performed by: NURSE PRACTITIONER

## 2021-12-08 PROCEDURE — 99214 OFFICE O/P EST MOD 30 MIN: CPT | Performed by: NURSE PRACTITIONER

## 2021-12-08 ASSESSMENT — ENCOUNTER SYMPTOMS
FEVER: 0
CHILLS: 0
NAUSEA: 0

## 2021-12-08 NOTE — LETTER
"   [facility]  [address] - [place] [address]  Phone:  [phone] - Fax:  [phone]   [facility] Progress Report and Disability Certification  Date of Service: 12/8/2021   No Show:  No  Date / Time of Next Visit: 12/17/2021   Claim Information   Patient Name: Valerie Dumont  Claim Number:     Employer:   *** Date of Injury: 10/10/2021     Insurer / TPA: [employer] *** ID / SSN:     Occupation:  *** Diagnosis: Diagnoses of Open nondisplaced fracture of distal phalanx of right middle finger with routine healing, subsequent encounter, Injury to fingernail of right hand, initial encounter, and Work related injury were pertinent to this visit.    Medical Information   Related to Industrial Injury? Yes ***   Subjective Complaints:  DOI 10/10   Working full duty. No difficulty working but is feeling that she is unable to lift anything at all. Feeling 'weakness' at tip of finger. Her finger started to bleed today when she was working..   No longer wearing her finger splint.    Taking ibuprofen or tylenol prn pain. Requesting prescription ibuprofen rather than OTC.   Today at work she was lifting 'something\" ( a jug)  and her finger nail  started bleeding. She wears artificial nails. Nail growing out. Started bleeding today. Feels \"sore\" all the time.  Finger feels weak to her. She cannot get a  on things.    Denies fever, chills.   Wears artificial nails that are currently white. There is a small amount of white substance at the base of her finger nail bed ( see image).    Has not scheduled appointments for occupational medicine or for sports medicine/ orthopedic. Someone called her to schedule an appointment but the time was no convient for her. She has not been able to reschedule as she did not know what phone number to call.   She says her work comp  will be assisting her with navigating work comp process.   Requesting a week of " total disability to rest.   Denies any new injury or trauma except lifting a jug today. Denies fever, chills, drainage ( except blood) from finger.      Objective Findings:     Pre-Existing Condition(s):     Assessment:   Initial Visit    Status: Additional Care Required  Permanent Disability:No    Plan: Medication  Comments:OTC analgesic of choice.     Diagnostics: X-ray  Comments:Increased conspicuity of a third terminal tuft fracture with interval mild volar displacement. Findings are suggestive of a nonunited fracture with bone resorption.    Comments:       Disability Information   Status: Released to Restricted Duty    From:  12/8/2021  Through: 12/17/2021 Restrictions are: Temporary   Physical Restrictions   Sitting:    Standing:    Stooping:    Bending:      Squatting:    Walking:    Climbing:    Pushing:      Pulling:    Other:    Reaching Above Shoulder (L):   Reaching Above Shoulder (R):       Reaching Below Shoulder (L):    Reaching Below Shoulder (R):      Not to exceed Weight Limits   Carrying(hrs):   Weight Limit(lb): < or = to 10 pounds  Comments:Right 3rd finger, right hand Lifting(hrs):   Weight  Limit(lb): < or = to 10 pounds  Comments:Right 3rd finger, right hand    Comments: limited use of right hand; wear splint at all time; keep right 3rd digit clean and dry; no submerging right hand in water  Finger splint placed today.   She will need to schedule an appointment with [facility] and with Occupational  Medicine as was referred previously. .     Repetitive Actions   Hands: i.e. Fine Manipulations from Grasping:     Feet: i.e. Operating Foot Controls:     Driving / Operate Machinery:     Health Care Provider’s Original or Electronic Signature  Alessia Soni, APetarPARY. Health Care Provider’s Original or Electronic Signature    Charlie Lugo MD         Clinic Name / Location: [facility] [address]  [place] [address] Clinic Phone  Number: Dept: [phone]   Appointment Time: 8:15 Am Visit Start Time: 8:29 AM   Check-In Time:  8:12 Am Visit Discharge Time:  ***   Original-Treating Physician or Chiropractor    Page 2-Insurer/TPA    Page 3-Employer    Page 4-Employee

## 2021-12-08 NOTE — LETTER
"   [facility] [place]  [address] - [place] [address]  Phone:  [phone] - Fax:  [phone]   [facility] Progress Report and Disability Certification  Date of Service: 12/8/2021   No Show:  No  Date / Time of Next Visit: 12/17/2021   Claim Information   Patient Name: Valerie Dumont  Claim Number:     Employer:   *** Date of Injury: 10/10/2021     Insurer / TPA: [employer] *** ID / SSN:     Occupation:  *** Diagnosis: Diagnoses of Open nondisplaced fracture of distal phalanx of right middle finger with routine healing, subsequent encounter, Injury to fingernail of right hand, initial encounter, and Work related injury were pertinent to this visit.    Medical Information   Related to Industrial Injury? Yes ***   Subjective Complaints:  DOI 10/10   Working full duty. No difficulty working but is feeling that she is unable to lift anything at all. Feeling 'weakness' at tip of finger. Her finger started to bleed today when she was working..   No longer wearing her finger splint.    Taking ibuprofen or tylenol prn pain. Requesting prescription ibuprofen rather than OTC.   Today at work she was lifting 'something\" ( a jug)  and her finger nail  started bleeding. She wears artificial nails. Nail growing out. Started bleeding today. Feels \"sore\" all the time.  Finger feels weak to her. She cannot get a  on things.    Denies fever, chills.   Wears artificial nails that are currently white. There is a small amount of white substance at the base of her finger nail bed ( see image).    Has not scheduled appointments for occupational medicine or for sports medicine/ orthopedic. Someone called her to schedule an appointment but the time was no convient for her. She has not been able to reschedule as she did not know what phone number to call.   She says her work comp  will be assisting her with navigating work comp process.   Requesting a week of " total disability to rest.   Denies any new injury or trauma except lifting a jug today. Denies fever, chills, drainage ( except blood) from finger.      Objective Findings: Physical Exam  Vitals and nursing note reviewed.   Constitutional:       General: She is not in acute distress.     Appearance: Normal appearance. She is normal weight.   Cardiovascular:      Rate and Rhythm: Normal rate.      Pulses: Normal pulses.   Pulmonary:      Effort: Pulmonary effort is normal.   Musculoskeletal:      Right hand: Swelling (mild) and tenderness present. Normal range of motion.      Comments: Damage to right 3rd finger nail and nail bed.   Open fracture      Skin:     General: Skin is warm.      Capillary Refill: Capillary refill takes less than 2 seconds.      Findings: Erythema (distal tip of right 3rd finger) present.   Neurological:      Mental Status: She is alert.   Psychiatric:         Attention and Perception: Attention normal.         Mood and Affect: Affect is blunt.         Speech: Speech normal.         Behavior: Behavior is cooperative.        Pre-Existing Condition(s):     Assessment:   Initial Visit    Status: Additional Care Required  Permanent Disability:No    Plan: Medication  Comments:OTC analgesic of choice.     Diagnostics: X-ray  Comments:Increased conspicuity of a third terminal tuft fracture with interval mild volar displacement. Findings are suggestive of a nonunited fracture with bone resorption.    Comments:       Disability Information   Status: Released to Restricted Duty    From:  12/8/2021  Through: 12/17/2021 Restrictions are: Temporary   Physical Restrictions   Sitting:    Standing:    Stooping:    Bending:      Squatting:    Walking:    Climbing:    Pushing:      Pulling:    Other:    Reaching Above Shoulder (L):   Reaching Above Shoulder (R):       Reaching Below Shoulder (L):    Reaching Below Shoulder (R):      Not to exceed Weight Limits   Carrying(hrs):   Weight Limit(lb): < or = to 10  pounds  Comments:Right 3rd finger, right hand Lifting(hrs):   Weight  Limit(lb): < or = to 10 pounds  Comments:Right 3rd finger, right hand    Comments: limited use of right hand; wear splint at all time; keep right 3rd digit clean and dry; no submerging right hand in water  Finger splint placed today.   She will need to schedule an appointment with [facility] and with Occupational  Medicine as was referred previously. .     Repetitive Actions   Hands: i.e. Fine Manipulations from Grasping:     Feet: i.e. Operating Foot Controls:     Driving / Operate Machinery:     Health Care Provider’s Original or Electronic Signature  Alessia Soni APetarPPetarN. Health Care Provider’s Original or Electronic Signature    Charlie Lugo MD         Clinic Name / Location: [address]  [place] [address] Clinic Phone Number: Dept: [phone]   Appointment Time: 8:15 Am Visit Start Time: 8:29 AM   Check-In Time:  8:12 Am Visit Discharge Time:  ***   Original-Treating Physician or Chiropractor    Page 2-Insurer/TPA    Page 3-Employer    Page 4-Employee

## 2021-12-08 NOTE — LETTER
"   [facility] [place]  [address] - [place] [address]  Phone:  [phone] - Fax:  [phone]   [facility] Progress Report and Disability Certification  Date of Service: 12/8/2021   No Show:  No  Date / Time of Next Visit: 12/17/2021 Next visit at [facility] clinic and Occupational Medicine ( [facility])    Claim Information   Patient Name: Valerie Dumont  Claim Number:     Employer:   [employer] Date of Injury: 10/10/2021     Insurer / TPA: [employer]  ID / SSN:     Occupation:   Diagnosis: Diagnoses of Open nondisplaced fracture of distal phalanx of right middle finger with routine healing, subsequent encounter, Injury to fingernail of right hand, initial encounter, and Work related injury were pertinent to this visit.    Medical Information   Related to Industrial Injury? Yes    Subjective Complaints:  DOI 10/10   Working full duty. No difficulty working but is feeling that she is unable to lift anything at all. Feeling 'weakness' at tip of finger. Her finger started to bleed today when she was working..   No longer wearing her finger splint.    Taking ibuprofen or tylenol prn pain. Requesting prescription ibuprofen rather than OTC.   Today at work she was lifting 'something\" ( a jug)  and her finger nail  started bleeding. She wears artificial nails. Nail growing out. Started bleeding today. Feels \"sore\" all the time.  Finger feels weak to her. She cannot get a  on things.    Denies fever, chills.   Wears artificial nails that are currently white. There is a small amount of white substance at the base of her finger nail bed ( see image).    Has not scheduled appointments for occupational medicine or for sports medicine/ orthopedic. Someone called her to schedule an appointment but the time was no convient for her. She has not been able to reschedule as she did not know what phone number to call.   She says her work comp  will be assisting " her with navigating work comp process.   Requesting a week of total disability to rest.   Denies any new injury or trauma except lifting a jug today. Denies fever, chills, drainage ( except blood) from finger.      Objective Findings: Physical Exam  Vitals and nursing note reviewed.   Constitutional:       General: She is not in acute distress.     Appearance: Normal appearance. She is normal weight.   Cardiovascular:      Rate and Rhythm: Normal rate.      Pulses: Normal pulses.   Pulmonary:      Effort: Pulmonary effort is normal.   Musculoskeletal:      Right hand: Swelling (mild) and tenderness present. Normal range of motion.      Comments: Damage to right 3rd finger nail and nail bed.   Open fracture      Skin:     General: Skin is warm.      Capillary Refill: Capillary refill takes less than 2 seconds.      Findings: Erythema (distal tip of right 3rd finger) present.   Neurological:      Mental Status: She is alert.   Psychiatric:         Attention and Perception: Attention normal.         Mood and Affect: Affect is blunt.         Speech: Speech normal.         Behavior: Behavior is cooperative.        Pre-Existing Condition(s):     Assessment:   Initial Visit    Status: Additional Care Required  Permanent Disability:No    Plan: Medication  Comments:OTC analgesic of choice.     Diagnostics: X-ray  Comments:Increased conspicuity of a third terminal tuft fracture with interval mild volar displacement. Findings are suggestive of a nonunited fracture with bone resorption.    Comments:       Disability Information   Status: Released to Restricted Duty    From:  12/8/2021  Through: 12/17/2021 Restrictions are: Temporary   Physical Restrictions   Sitting:    Standing:    Stooping:    Bending:      Squatting:    Walking:    Climbing:    Pushing:      Pulling:    Other:    Reaching Above Shoulder (L):   Reaching Above Shoulder (R):       Reaching Below Shoulder (L):    Reaching Below Shoulder (R):      Not to exceed  Weight Limits   Carrying(hrs):   Weight Limit(lb): < or = to 10 pounds  Comments:Right 3rd finger, right hand Lifting(hrs):   Weight  Limit(lb): < or = to 10 pounds  Comments:Right 3rd finger, right hand    Comments: limited use of right hand; wear splint at all time; keep right 3rd digit clean and dry; no submerging right hand in water  Finger splint placed today.   She will need to schedule an appointment with [facility] and with Occupational  Medicine as was referred previously. .     Repetitive Actions   Hands: i.e. Fine Manipulations from Grasping:     Feet: i.e. Operating Foot Controls:     Driving / Operate Machinery:     Health Care Provider’s Original or Electronic Signature  Alessia Soni, A.P.N. Health Care Provider’s Original or Electronic Signature    Charlie Lugo MD         Clinic Name / Location: [address]  [place] [address] Clinic Phone Number: Dept: [phone]   Appointment Time: 8:15 Am Visit Start Time: 8:29 AM   Check-In Time:  8:12 Am Visit Discharge Time:  10:54 AM   Original-Treating Physician or Chiropractor    Page 2-Insurer/TPA    Page 3-Employer    Page 4-Employee

## 2021-12-08 NOTE — LETTER
"   [facility] [place]  [address] - [place] [address]  Phone:  [phone] - Fax:  [phone]   [facility] Progress Report and Disability Certification  Date of Service: 12/8/2021   No Show:  No  Date / Time of Next Visit: 12/17/2021 FOLLOW UP with occupational medicine and with [facility].    Claim Information   Patient Name: Valerie Dumont  Claim Number:     Employer:   [employer] Date of Injury: 10/10/2021     Insurer / TPA: [facility]  ID / SSN:     Occupation:   Diagnosis: Diagnoses of Open nondisplaced fracture of distal phalanx of right middle finger with routine healing, subsequent encounter, Injury to fingernail of right hand, initial encounter, and Work related injury were pertinent to this visit.    Medical Information   Related to Industrial Injury? Yes    Subjective Complaints:  DOI 10/10   Working full duty. No difficulty working but is feeling that she is unable to lift anything at all. Feeling 'weakness' at tip of finger. Her finger started to bleed today when she was working..   No longer wearing her finger splint.    Taking ibuprofen or tylenol prn pain. Requesting prescription ibuprofen rather than OTC.   Today at work she was lifting 'something\" ( a jug)  and her finger nail  started bleeding. She wears artificial nails. Nail growing out. Started bleeding today. Feels \"sore\" all the time.  Finger feels weak to her. She cannot get a  on things.    Denies fever, chills.   Wears artificial nails that are currently white. There is a small amount of white substance at the base of her finger nail bed ( see image).    Has not scheduled appointments for occupational medicine or for sports medicine/ orthopedic. Someone called her to schedule an appointment but the time was no convient for her. She has not been able to reschedule as she did not know what phone number to call.   She says her work comp  will be " assisting her with navigating work comp process.   Requesting a week of total disability to rest.   Denies any new injury or trauma except lifting a jug today. Denies fever, chills, drainage ( except blood) from finger.      Objective Findings:     Pre-Existing Condition(s):     Assessment:   Initial Visit    Status: Additional Care Required  Permanent Disability:No    Plan: Medication  Comments:OTC analgesic of choice.     Diagnostics: X-ray  Comments:Increased conspicuity of a third terminal tuft fracture with interval mild volar displacement. Findings are suggestive of a nonunited fracture with bone resorption.    Comments:       Disability Information   Status: Released to Restricted Duty    From:  12/8/2021  Through: 12/17/2021 Restrictions are: Temporary   Physical Restrictions   Sitting:    Standing:    Stooping:    Bending:      Squatting:    Walking:    Climbing:    Pushing:      Pulling:    Other:    Reaching Above Shoulder (L):   Reaching Above Shoulder (R):       Reaching Below Shoulder (L):    Reaching Below Shoulder (R):      Not to exceed Weight Limits   Carrying(hrs):   Weight Limit(lb): < or = to 10 pounds  Comments:Right 3rd finger, right hand Lifting(hrs):   Weight  Limit(lb): < or = to 10 pounds  Comments:Right 3rd finger, right hand    Comments: limited use of right hand; wear splint at all time; keep right 3rd digit clean and dry; no submerging right hand in water  Finger splint placed today.   She will need to schedule an appointment with [facility] and with Occupational  Medicine as was referred previously. .     Repetitive Actions   Hands: i.e. Fine Manipulations from Grasping:     Feet: i.e. Operating Foot Controls:     Driving / Operate Machinery:     Health Care Provider’s Original or Electronic Signature  ALLY Nassar. Health Care Provider’s Original or Electronic Signature    Charlie Lugo MD         Clinic Name / Location: [facility]  [address]  [place] [address] Clinic Phone Number: Dept: [phone]   Appointment Time: 8:15 Am Visit Start Time: 8:29 AM   Check-In Time:  8:12 Am Visit Discharge Time:  9:43 AM   Original-Treating Physician or Chiropractor    Page 2-Insurer/TPA    Page 3-Employer    Page 4-Employee

## 2021-12-08 NOTE — PROGRESS NOTES
"Subjective     Valerie Dumont is a 61 y.o. female who presents with Finger Injury (x 1, nail finger broke, finger was broken previusly at work)           HPI DOI 10/10   Working full duty. No difficulty working but is feeling that she is unable to lift anything at all. Feeling 'weakness' at tip of finger. Her finger started to bleed today when she was working..   No longer wearing her finger splint.    Taking ibuprofen or tylenol prn pain. Requesting prescription ibuprofen rather than OTC.   Today at work she was lifting 'something\" ( a jug)  and her finger nail  started bleeding. She wears artificial nails. Nail growing out. Started bleeding today. Feels \"sore\" all the time.  Finger feels weak to her. She cannot get a  on things.  FROM to finger and distal planx joint.    Denies fever, chills.   Wears artificial nails that are currently white. There is a small amount of white substance at the base of her finger nail bed ( see image).    Has not scheduled appointments for occupational medicine or for sports medicine.   She says her work comp  will be assisting her with navigating work comp process.   Requesting a week of total disability to rest.     Review of Systems   Constitutional: Negative for chills and fever.   Gastrointestinal: Negative for nausea.              Objective     /62   Pulse 68   Temp 37.5 °C (99.5 °F) (Temporal)   Resp 12   Ht 1.626 m (5' 4\")   Wt 47.6 kg (105 lb)   SpO2 97%   Breastfeeding No   BMI 18.02 kg/m²      Physical Exam  Vitals and nursing note reviewed.   Constitutional:       General: She is not in acute distress.     Appearance: Normal appearance. She is normal weight.   Cardiovascular:      Rate and Rhythm: Normal rate.      Pulses: Normal pulses.   Pulmonary:      Effort: Pulmonary effort is normal.   Musculoskeletal:      Right hand: Swelling (mild) and tenderness present. Normal range of motion.      Comments: Damage to right 3rd finger nail and nail bed. "   Open fracture      Skin:     General: Skin is warm.      Capillary Refill: Capillary refill takes less than 2 seconds.      Findings: Erythema (distal tip of right 3rd finger) present.   Neurological:      Mental Status: She is alert.   Psychiatric:         Attention and Perception: Attention normal.         Mood and Affect: Affect is blunt.         Speech: Speech normal.         Behavior: Behavior is cooperative.                      RADIOLOGY RESULTS   DX-FINGER(S) 2+ RIGHT    Result Date: 12/8/2021 12/8/2021 9:07 AM HISTORY/REASON FOR EXAM:  Pain/Deformity Following Trauma; Right 3rd finger fracture.. . TECHNIQUE/EXAM DESCRIPTION AND NUMBER OF VIEWS:  3 views of the RIGHT fingers. COMPARISON: 10/10/2021 FINDINGS: Bone mineralization is age-appropriate. There is an obliquely oriented fracture of the third terminal tuft. Versus the previous exam, there is slight volar displacement of the distal fragment. The fracture lucency is much more apparent than the previous exam suggesting a nonunited fracture with bone resorbed and. No new fracture or dislocation is identified.     Increased conspicuity of a third terminal tuft fracture with interval mild volar displacement. Findings are suggestive of a nonunited fracture with bone resorption.         Xray: Reviewed and interpreted independently by me. I agree with the radiologist's findings.            Assessment & Plan        1. Open nondisplaced fracture of distal phalanx of right middle finger with routine healing, subsequent encounter  DX-FINGER(S) 2+ RIGHT   2. Injury to fingernail of right hand, initial encounter     3. Work related injury         See [place] D39   Pt advised not to put anything except dry bandaid over wound.   Results of all diagnostic tests discussed with patient including any incidental findings.     Pt is to be seen by occupational medicine at [facility] for all further visits concerning this injury and/ or at [facility] clinic.  Referrals already placed on  previous visits.     I have spent  30 minutes on the care of this patient.  This includes preparing for visit which includes review of previous visits if available in EMR, obtaining HPI, exam and evaluation of patient,reviewing previous treatments and xrays,  ordering and independent interpretation of labs, imaging, tests, medical management, counseling, education and documentation.

## 2022-01-13 ENCOUNTER — HOSPITAL ENCOUNTER (OUTPATIENT)
Facility: MEDICAL CENTER | Age: 62
End: 2022-01-13
Attending: SPECIALIST
Payer: MEDICAID

## 2022-01-13 PROCEDURE — 87591 N.GONORRHOEAE DNA AMP PROB: CPT

## 2022-01-13 PROCEDURE — 87491 CHLMYD TRACH DNA AMP PROBE: CPT

## 2022-01-13 PROCEDURE — 87624 HPV HI-RISK TYP POOLED RSLT: CPT

## 2022-01-13 PROCEDURE — 88175 CYTOPATH C/V AUTO FLUID REDO: CPT

## 2022-01-16 LAB
C TRACH DNA GENITAL QL NAA+PROBE: NEGATIVE
CYTOLOGY REG CYTOL: NORMAL
HPV HR 12 DNA CVX QL NAA+PROBE: NEGATIVE
HPV16 DNA SPEC QL NAA+PROBE: NEGATIVE
HPV18 DNA SPEC QL NAA+PROBE: NEGATIVE
N GONORRHOEA DNA GENITAL QL NAA+PROBE: NEGATIVE
SPECIMEN SOURCE: NORMAL
SPECIMEN SOURCE: NORMAL

## 2022-01-17 PROBLEM — S62.662A CLOSED NONDISPLACED FRACTURE OF DISTAL PHALANX OF RIGHT MIDDLE FINGER: Status: ACTIVE | Noted: 2022-01-17

## 2022-01-17 PROBLEM — S62.632A CLOSED DISPLACED FRACTURE OF DISTAL PHALANX OF RIGHT MIDDLE FINGER: Status: ACTIVE | Noted: 2022-01-17

## 2022-04-11 ENCOUNTER — HOSPITAL ENCOUNTER (OUTPATIENT)
Facility: MEDICAL CENTER | Age: 62
End: 2022-04-11
Attending: SPECIALIST
Payer: COMMERCIAL

## 2022-04-11 PROCEDURE — 87591 N.GONORRHOEAE DNA AMP PROB: CPT

## 2022-04-11 PROCEDURE — 88175 CYTOPATH C/V AUTO FLUID REDO: CPT

## 2022-04-11 PROCEDURE — 87491 CHLMYD TRACH DNA AMP PROBE: CPT

## 2022-04-11 PROCEDURE — 87624 HPV HI-RISK TYP POOLED RSLT: CPT

## 2023-04-13 ENCOUNTER — APPOINTMENT (OUTPATIENT)
Dept: ADMISSIONS | Facility: MEDICAL CENTER | Age: 63
End: 2023-04-13
Attending: PODIATRIST
Payer: MEDICAID

## 2023-04-17 ENCOUNTER — APPOINTMENT (OUTPATIENT)
Dept: RADIOLOGY | Facility: MEDICAL CENTER | Age: 63
End: 2023-04-17
Attending: PODIATRIST
Payer: MEDICAID

## 2023-04-17 ENCOUNTER — HOSPITAL ENCOUNTER (OUTPATIENT)
Facility: MEDICAL CENTER | Age: 63
End: 2023-04-17
Attending: PODIATRIST | Admitting: PODIATRIST
Payer: MEDICAID

## 2023-04-17 ENCOUNTER — ANESTHESIA EVENT (OUTPATIENT)
Dept: SURGERY | Facility: MEDICAL CENTER | Age: 63
End: 2023-04-17
Payer: MEDICAID

## 2023-04-17 ENCOUNTER — ANESTHESIA (OUTPATIENT)
Dept: SURGERY | Facility: MEDICAL CENTER | Age: 63
End: 2023-04-17
Payer: MEDICAID

## 2023-04-17 VITALS
DIASTOLIC BLOOD PRESSURE: 74 MMHG | WEIGHT: 125 LBS | TEMPERATURE: 97.6 F | SYSTOLIC BLOOD PRESSURE: 126 MMHG | RESPIRATION RATE: 16 BRPM | BODY MASS INDEX: 21.34 KG/M2 | OXYGEN SATURATION: 98 % | HEART RATE: 69 BPM | HEIGHT: 64 IN

## 2023-04-17 PROCEDURE — 01480 ANES OPEN PX LOWER L/A/F NOS: CPT | Performed by: ANESTHESIOLOGY

## 2023-04-17 PROCEDURE — 502000 HCHG MISC OR IMPLANTS RC 0278: Performed by: PODIATRIST

## 2023-04-17 PROCEDURE — C1776 JOINT DEVICE (IMPLANTABLE): HCPCS | Performed by: PODIATRIST

## 2023-04-17 PROCEDURE — 160039 HCHG SURGERY MINUTES - EA ADDL 1 MIN LEVEL 3: Performed by: PODIATRIST

## 2023-04-17 PROCEDURE — 73620 X-RAY EXAM OF FOOT: CPT | Mod: RT

## 2023-04-17 PROCEDURE — E0114 CRUTCH UNDERARM PAIR NO WOOD: HCPCS | Performed by: PODIATRIST

## 2023-04-17 PROCEDURE — 160048 HCHG OR STATISTICAL LEVEL 1-5: Performed by: PODIATRIST

## 2023-04-17 PROCEDURE — 160028 HCHG SURGERY MINUTES - 1ST 30 MINS LEVEL 3: Performed by: PODIATRIST

## 2023-04-17 PROCEDURE — 73620 X-RAY EXAM OF FOOT: CPT | Mod: LT

## 2023-04-17 PROCEDURE — 700111 HCHG RX REV CODE 636 W/ 250 OVERRIDE (IP): Performed by: PODIATRIST

## 2023-04-17 PROCEDURE — 700105 HCHG RX REV CODE 258: Performed by: PODIATRIST

## 2023-04-17 PROCEDURE — 700101 HCHG RX REV CODE 250: Performed by: PODIATRIST

## 2023-04-17 PROCEDURE — 700111 HCHG RX REV CODE 636 W/ 250 OVERRIDE (IP): Performed by: ANESTHESIOLOGY

## 2023-04-17 PROCEDURE — 160046 HCHG PACU - 1ST 60 MINS PHASE II: Performed by: PODIATRIST

## 2023-04-17 PROCEDURE — 160025 RECOVERY II MINUTES (STATS): Performed by: PODIATRIST

## 2023-04-17 PROCEDURE — 700101 HCHG RX REV CODE 250: Performed by: ANESTHESIOLOGY

## 2023-04-17 PROCEDURE — 160009 HCHG ANES TIME/MIN: Performed by: PODIATRIST

## 2023-04-17 DEVICE — IMPLANTABLE DEVICE: Type: IMPLANTABLE DEVICE | Status: FUNCTIONAL

## 2023-04-17 RX ORDER — HYDROMORPHONE HYDROCHLORIDE 1 MG/ML
0.2 INJECTION, SOLUTION INTRAMUSCULAR; INTRAVENOUS; SUBCUTANEOUS
Status: CANCELLED | OUTPATIENT
Start: 2023-04-17

## 2023-04-17 RX ORDER — DEXAMETHASONE SODIUM PHOSPHATE 4 MG/ML
INJECTION, SOLUTION INTRA-ARTICULAR; INTRALESIONAL; INTRAMUSCULAR; INTRAVENOUS; SOFT TISSUE
Status: DISCONTINUED
Start: 2023-04-17 | End: 2023-04-17 | Stop reason: HOSPADM

## 2023-04-17 RX ORDER — MEPERIDINE HYDROCHLORIDE 25 MG/ML
12.5 INJECTION INTRAMUSCULAR; INTRAVENOUS; SUBCUTANEOUS
Status: CANCELLED | OUTPATIENT
Start: 2023-04-17

## 2023-04-17 RX ORDER — DEXAMETHASONE SODIUM PHOSPHATE 4 MG/ML
INJECTION, SOLUTION INTRA-ARTICULAR; INTRALESIONAL; INTRAMUSCULAR; INTRAVENOUS; SOFT TISSUE PRN
Status: DISCONTINUED | OUTPATIENT
Start: 2023-04-17 | End: 2023-04-17 | Stop reason: SURG

## 2023-04-17 RX ORDER — BUPIVACAINE HYDROCHLORIDE AND EPINEPHRINE 5; 5 MG/ML; UG/ML
INJECTION, SOLUTION PERINEURAL
Status: DISCONTINUED | OUTPATIENT
Start: 2023-04-17 | End: 2023-04-17 | Stop reason: HOSPADM

## 2023-04-17 RX ORDER — PROGESTERONE 200 MG/1
200 CAPSULE ORAL
COMMUNITY
Start: 2023-02-06

## 2023-04-17 RX ORDER — CYCLOSPORINE 0 G/ML
1 SOLUTION/ DROPS OPHTHALMIC; TOPICAL 2 TIMES DAILY
COMMUNITY
Start: 2023-03-21

## 2023-04-17 RX ORDER — MAGNESIUM HYDROXIDE 1200 MG/15ML
LIQUID ORAL
Status: COMPLETED | OUTPATIENT
Start: 2023-04-17 | End: 2023-04-17

## 2023-04-17 RX ORDER — CEFAZOLIN SODIUM 1 G/3ML
INJECTION, POWDER, FOR SOLUTION INTRAMUSCULAR; INTRAVENOUS PRN
Status: DISCONTINUED | OUTPATIENT
Start: 2023-04-17 | End: 2023-04-17 | Stop reason: SURG

## 2023-04-17 RX ORDER — MIDAZOLAM HYDROCHLORIDE 1 MG/ML
1 INJECTION INTRAMUSCULAR; INTRAVENOUS
Status: CANCELLED | OUTPATIENT
Start: 2023-04-17

## 2023-04-17 RX ORDER — IPRATROPIUM BROMIDE AND ALBUTEROL SULFATE 2.5; .5 MG/3ML; MG/3ML
3 SOLUTION RESPIRATORY (INHALATION)
Status: CANCELLED | OUTPATIENT
Start: 2023-04-17

## 2023-04-17 RX ORDER — LIDOCAINE HYDROCHLORIDE 20 MG/ML
INJECTION, SOLUTION EPIDURAL; INFILTRATION; INTRACAUDAL; PERINEURAL PRN
Status: DISCONTINUED | OUTPATIENT
Start: 2023-04-17 | End: 2023-04-17 | Stop reason: SURG

## 2023-04-17 RX ORDER — KETOROLAC TROMETHAMINE 30 MG/ML
INJECTION, SOLUTION INTRAMUSCULAR; INTRAVENOUS PRN
Status: DISCONTINUED | OUTPATIENT
Start: 2023-04-17 | End: 2023-04-17 | Stop reason: SURG

## 2023-04-17 RX ORDER — HYDROMORPHONE HYDROCHLORIDE 1 MG/ML
0.4 INJECTION, SOLUTION INTRAMUSCULAR; INTRAVENOUS; SUBCUTANEOUS
Status: CANCELLED | OUTPATIENT
Start: 2023-04-17

## 2023-04-17 RX ORDER — SODIUM CHLORIDE, SODIUM LACTATE, POTASSIUM CHLORIDE, CALCIUM CHLORIDE 600; 310; 30; 20 MG/100ML; MG/100ML; MG/100ML; MG/100ML
INJECTION, SOLUTION INTRAVENOUS CONTINUOUS
Status: DISCONTINUED | OUTPATIENT
Start: 2023-04-17 | End: 2023-04-17 | Stop reason: HOSPADM

## 2023-04-17 RX ORDER — DIPHENHYDRAMINE HYDROCHLORIDE 50 MG/ML
12.5 INJECTION INTRAMUSCULAR; INTRAVENOUS
Status: CANCELLED | OUTPATIENT
Start: 2023-04-17

## 2023-04-17 RX ORDER — ONDANSETRON 2 MG/ML
4 INJECTION INTRAMUSCULAR; INTRAVENOUS
Status: CANCELLED | OUTPATIENT
Start: 2023-04-17

## 2023-04-17 RX ORDER — OXYCODONE HCL 5 MG/5 ML
10 SOLUTION, ORAL ORAL
Status: CANCELLED | OUTPATIENT
Start: 2023-04-17

## 2023-04-17 RX ORDER — SODIUM CHLORIDE, SODIUM GLUCONATE, SODIUM ACETATE, POTASSIUM CHLORIDE AND MAGNESIUM CHLORIDE 526; 502; 368; 37; 30 MG/100ML; MG/100ML; MG/100ML; MG/100ML; MG/100ML
500 INJECTION, SOLUTION INTRAVENOUS CONTINUOUS
Status: CANCELLED | OUTPATIENT
Start: 2023-04-17

## 2023-04-17 RX ORDER — HALOPERIDOL 5 MG/ML
1 INJECTION INTRAMUSCULAR
Status: CANCELLED | OUTPATIENT
Start: 2023-04-17

## 2023-04-17 RX ORDER — DEXAMETHASONE SODIUM PHOSPHATE 4 MG/ML
INJECTION, SOLUTION INTRA-ARTICULAR; INTRALESIONAL; INTRAMUSCULAR; INTRAVENOUS; SOFT TISSUE
Status: DISCONTINUED | OUTPATIENT
Start: 2023-04-17 | End: 2023-04-17 | Stop reason: HOSPADM

## 2023-04-17 RX ORDER — OXYCODONE HCL 5 MG/5 ML
5 SOLUTION, ORAL ORAL
Status: CANCELLED | OUTPATIENT
Start: 2023-04-17

## 2023-04-17 RX ORDER — HYDROMORPHONE HYDROCHLORIDE 1 MG/ML
1 INJECTION, SOLUTION INTRAMUSCULAR; INTRAVENOUS; SUBCUTANEOUS
Status: CANCELLED | OUTPATIENT
Start: 2023-04-17

## 2023-04-17 RX ORDER — GLYCOPYRROLATE 0.2 MG/ML
INJECTION INTRAMUSCULAR; INTRAVENOUS PRN
Status: DISCONTINUED | OUTPATIENT
Start: 2023-04-17 | End: 2023-04-17 | Stop reason: SURG

## 2023-04-17 RX ADMIN — PROPOFOL 50 MG: 10 INJECTION, EMULSION INTRAVENOUS at 08:15

## 2023-04-17 RX ADMIN — CEFAZOLIN 2 G: 330 INJECTION, POWDER, FOR SOLUTION INTRAMUSCULAR; INTRAVENOUS at 08:11

## 2023-04-17 RX ADMIN — SODIUM CHLORIDE, POTASSIUM CHLORIDE, SODIUM LACTATE AND CALCIUM CHLORIDE: 600; 310; 30; 20 INJECTION, SOLUTION INTRAVENOUS at 08:06

## 2023-04-17 RX ADMIN — PROPOFOL 100 MG: 10 INJECTION, EMULSION INTRAVENOUS at 08:12

## 2023-04-17 RX ADMIN — KETOROLAC TROMETHAMINE 30 MG: 30 INJECTION, SOLUTION INTRAMUSCULAR at 09:16

## 2023-04-17 RX ADMIN — DEXAMETHASONE SODIUM PHOSPHATE 8 MG: 4 INJECTION, SOLUTION INTRA-ARTICULAR; INTRALESIONAL; INTRAMUSCULAR; INTRAVENOUS; SOFT TISSUE at 08:14

## 2023-04-17 RX ADMIN — GLYCOPYRROLATE 0.2 MG: 0.2 INJECTION INTRAMUSCULAR; INTRAVENOUS at 08:16

## 2023-04-17 RX ADMIN — GLYCOPYRROLATE 0.2 MG: 0.2 INJECTION INTRAMUSCULAR; INTRAVENOUS at 08:23

## 2023-04-17 RX ADMIN — LIDOCAINE HYDROCHLORIDE 40 MG: 20 INJECTION, SOLUTION EPIDURAL; INFILTRATION; INTRACAUDAL at 08:12

## 2023-04-17 ASSESSMENT — PAIN SCALES - GENERAL: PAIN_LEVEL: 0

## 2023-04-17 NOTE — PROGRESS NOTES
Patient discharged home with son. Vital signs stable/ NAD noted. Transported via wheelchair to [facility] without incident.

## 2023-04-17 NOTE — DISCHARGE INSTRUCTIONS
HOME CARE INSTRUCTIONS    ACTIVITY: Rest and take it easy for the first 24 hours.  A responsible adult is recommended to remain with you during that time.  It is normal to feel sleepy.  We encourage you to not do anything that requires balance, judgment or coordination.    FOR 24 HOURS DO NOT:  Drive, operate machinery or run household appliances.  Drink beer or alcoholic beverages.  Make important decisions or sign legal documents.    SPECIAL INSTRUCTIONS:     Toe Deformity Repair, Care After  This sheet gives you information about how to care for yourself after your procedure. Your health care provider may also give you more specific instructions. If you have problems or questions, contact your health care provider.  What can I expect after the procedure?  After the procedure, it is common to have:  Pain in the affected area.  Discomfort with walking.  Follow these instructions at home:  If you have a post-operative shoe:  Wear the shoe as told by your health care provider. Remove it only as told by your health care provider.  Loosen the shoe if your toes tingle, become numb, or turn cold and blue.  Keep the shoe clean and dry.  Bathing  Do not take baths, swim, or use a hot tub until your health care provider approves. Ask your health care provider if you can take showers. You may only be allowed to take sponge baths.  If your post-operative shoe is not waterproof, cover it with a watertight covering when you take a bath or a shower.  Keep the bandage (dressing) dry until your health care provider says it can be removed.  Incision care  Follow instructions from your health care provider about how to take care of your incision. Make sure you:  Wash your hands with soap and water before you change your dressing. If soap and water are not available, use hand .  Change your dressing as told by your health care provider.  Leave stitches (sutures), skin glue, or adhesive strips in place. These skin closures  may need to stay in place for 2 weeks or longer.  Check your incision area every day for signs of infection. Check for:  Redness, swelling, or pain.  Fluid or blood.  Warmth.  Pus or a bad smell.  Managing pain, stiffness, and swelling  If directed, put ice on the affected area.  Put ice in a plastic bag.  Place a towel between your skin and the bag.  Leave the ice on for 20 minutes, 2-3 times a day.  Raise (elevate) the affected foot above the level of your heart while you are sitting or lying down.  Driving  Do not drive or use heavy machinery while taking prescription pain medicine.  Ask your health care provider when it is safe to drive if you have a post-operative shoe on your foot.  Activity  Walk and return to your normal activities as told by your health care provider. Ask your health care provider what activities are safe for you.  Do not use your affected foot to support your body weight until your health care provider says that you can. Use crutches as directed by your health care provider.  Do exercises as told by your health care provider or physical therapist.  General instructions  Take over-the-counter and prescription medicines only as told by your health care provider.  If you are taking prescription pain medicine, take actions to prevent or treat constipation. Your health care provider may recommend that you:  Drink enough fluid to keep your urine pale yellow.  Eat foods that are high in fiber, such as fresh fruits and vegetables, whole grains, and beans.  Limit foods that are high in fat and processed sugars, such as fried or sweet foods.  Take an over-the-counter or prescription medicine for constipation.  Do not use any products that contain nicotine or tobacco, such as cigarettes and e-cigarettes. These can delay bone healing. If you need help quitting, ask your health care provider.  Keep all follow-up visits as told by your health care provider. This is important.  Contact a health care  provider if:  You have redness, swelling, or pain at your incision site.  You have fluid or blood coming from your incision.  Your incision feels warm to the touch.  You have pus or a bad smell coming from the incision area or the dressing.  You have a fever.  Get help right away if:  You develop a rash.  You have difficulty breathing.  Summary  After the procedure, it is common to have pain in the affected area and discomfort with walking.  Follow instructions from your health care provider about how to take care of your incision.  Walk and return to your normal activities as told by your health care provider. Ask your health care provider what activities are safe for you.  Keep all follow-up visits as told by your health care provider.      DIET: To avoid nausea, slowly advance diet as tolerated, avoiding spicy or greasy foods for the first day.  Add more substantial food to your diet according to your physician's instructions.  Babies can be fed formula or breast milk as soon as they are hungry.  INCREASE FLUIDS AND FIBER TO AVOID CONSTIPATION.    SURGICAL DRESSING/BATHING: Maintain dressing to be clean and dry until follow up with MD. Okay to shower with dressings covered.     MEDICATIONS: Resume taking daily medication.  Take prescribed pain medication with food.  If no medication is prescribed, you may take non-aspirin pain medication if needed.  PAIN MEDICATION CAN BE VERY CONSTIPATING.  Take a stool softener or laxative such as senokot, pericolace, or milk of magnesia if needed.    A follow-up appointment should be arranged with your doctor in 1-2 weeks; call to schedule.    You should CALL YOUR PHYSICIAN if you develop:  Fever greater than 101 degrees F.  Pain not relieved by medication, or persistent nausea or vomiting.  Excessive bleeding (blood soaking through dressing) or unexpected drainage from the wound.  Extreme redness or swelling around the incision site, drainage of pus or foul smelling  drainage.  Inability to urinate or empty your bladder within 8 hours.  Problems with breathing or chest pain.    You should call 911 if you develop problems with breathing or chest pain.  If you are unable to contact your doctor or surgical center, you should go to the nearest emergency room or urgent care center.  Physician's telephone #: Dr. Benton [phone]    MILD FLU-LIKE SYMPTOMS ARE NORMAL.  YOU MAY EXPERIENCE GENERALIZED MUSCLE ACHES, THROAT IRRITATION, HEADACHE AND/OR SOME NAUSEA.    If any questions arise, call your doctor.  If your doctor is not available, please feel free to call the Surgical Center at [phone].  The Center is open Monday through Friday from 7AM to 7PM.      A registered nurse may call you a few days after your surgery to see how you are doing after your procedure.    You may also receive a survey in the mail within the next two weeks and we ask that you take a few moments to complete the survey and return it to us.  Our goal is to provide you with very good care and we value your comments.     Depression / Suicide Risk    As you are discharged from this [facility] facility, it is important to learn how to keep safe from harming yourself.    Recognize the warning signs:  Abrupt changes in personality, positive or negative- including increase in energy   Giving away possessions  Change in eating patterns- significant weight changes-  positive or negative  Change in sleeping patterns- unable to sleep or sleeping all the time   Unwillingness or inability to communicate  Depression  Unusual sadness, discouragement and loneliness  Talk of wanting to die  Neglect of personal appearance   Rebelliousness- reckless behavior  Withdrawal from people/activities they love  Confusion- inability to concentrate     If you or a loved one observes any of these behaviors or has concerns about self-harm, here's what you can do:  Talk about it- your feelings and reasons for harming  yourself  Remove any means that you might use to hurt yourself (examples: pills, rope, extension cords, firearm)  Get professional help from the community (Mental Health, Substance Abuse, psychological counseling)  Do not be alone:Call your Safe Contact- someone whom you trust who will be there for you.  Call your local CRISIS HOTLINE 211-1325 or 673-425-6448  Call your local Children's Mobile Crisis Response Team [place] (382) 894-4279 or www.[employer]  Call the toll free National Suicide Prevention Hotlines   National Suicide Prevention Lifeline 038-601-UYUP (6795)  National Hope Line Network 800-SUICIDE (643-5497)    I acknowledge receipt and understanding of these Home Care instructions.

## 2023-04-17 NOTE — ANESTHESIA POSTPROCEDURE EVALUATION
Patient: Valerie Dumont    Procedure Summary     Date: 04/17/23 Room / Location: [place] [address] / SURGERY [facility]    Anesthesia Start: 0806 Anesthesia Stop: 0919    Procedure: HAMMER TOE CORRECTION BILATERAL 4TH AND 5TH TOES (Bilateral: Toe) Diagnosis: (BILATERAL HAMMERTOES 4TH AND 5TH)    Surgeons: Aidee Benton D.P.M. Responsible Provider: Mike Rivera III, M.D.    Anesthesia Type: general ASA Status: 2          Final Anesthesia Type: general  Last vitals  BP   Blood Pressure: 126/74    Temp   36.4 °C (97.6 °F)    Pulse   69   Resp   16    SpO2   98 %      Anesthesia Post Evaluation    Patient location during evaluation: PACU  Patient participation: complete - patient participated  Level of consciousness: awake and alert  Pain score: 0    Airway patency: patent  Anesthetic complications: no  Cardiovascular status: hemodynamically stable  Respiratory status: acceptable  Hydration status: euvolemic    PONV: none          No notable events documented.

## 2023-04-17 NOTE — ANESTHESIA TIME REPORT
Anesthesia Start and Stop Event Times     Date Time Event    4/17/2023 0749 Ready for Procedure     0806 Anesthesia Start     0919 Anesthesia Stop        Responsible Staff  04/17/23    Name Role Begin End    Mike Rivera III, M.D. Anesth 0806 0919        Overtime Reason:  no overtime (within assigned shift)    Comments:

## 2023-04-17 NOTE — OR SURGEON
Immediate Post OP Note    PreOp Diagnosis: hammertoe 4 ,5 right and hammertoe 4,5 left      PostOp Diagnosis: same      Procedure(s):  HAMMER TOE CORRECTION BILATERAL 4TH AND 5TH TOES - Wound Class: Clean    Surgeon(s):  Aidee Benton D.P.M.    Anesthesiologist/Type of Anesthesia:  Anesthesiologist: Mike Rivera III, M.D./LE    Surgical Staff:  Circulator: Ary Ibarra R.N.  Relief Circulator: Alban Trevizo R.N.  Scrub Person: Maryanne Fermin  Radiology Technologist: Brittany Maravilla    Specimens removed if any:  * No specimens in log *    Estimated Blood Loss: min    Findings: c/w dx    Complications: none        4/17/2023 9:26 AM Aidee Benton D.P.M.

## 2023-04-17 NOTE — ANESTHESIA PREPROCEDURE EVALUATION
Case: [id number] Date/Time: 04/17/23 0745    Procedure: HAMMER TOE CORRECTION 5TH LEFT AND 5TH RIGHT (Bilateral: Toe)    Anesthesia type: General    Pre-op diagnosis: HAMMERTOES RIGHT AND LEFT 5TH TOES    [facility]GERY [facility]    Surgeons: KARIS NavarroPJEREMIAH          Relevant Problems   No relevant active problems       Physical Exam    Airway   Mallampati: II  TM distance: >3 FB  Neck ROM: full       Cardiovascular - normal exam  Rhythm: regular  Rate: normal  (-) murmur     Dental - normal exam           Pulmonary - normal exam  Breath sounds clear to auscultation     Abdominal    Neurological - normal exam                 Anesthesia Plan    ASA 2       Plan - general       Airway plan will be natural airway    (Propofol TIVA)      Induction: intravenous    Postoperative Plan: Postoperative administration of opioids is intended.    Pertinent diagnostic labs and testing reviewed    Informed Consent:    Anesthetic plan and risks discussed with patient.    Use of blood products discussed with: patient whom consented to blood products.

## 2023-04-17 NOTE — OP REPORT
PreOp Diagnosis: hammertoe 4 ,5 right and hammertoe 4,5 left      PostOp Diagnosis: same      Procedure(s):  HAMMER TOE CORRECTION BILATERAL 4TH AND 5TH TOES - Wound Class: Clean    Surgeon(s):  Aidee Benton D.P.M.    Anesthesiologist/Type of Anesthesia:  Anesthesiologist: Mike Rivera III, M.D./LE    Surgical Staff:  Circulator: Ary Ibarra R.N.  Relief Circulator: Alban Trevizo R.N.  Scrub Person: Maryanne Fermin  Radiology Technologist: Brittany Maravilla    Specimens removed if any:  * No specimens in log *    Estimated Blood Loss: min    Findings: c/w dx    Complications: none    Report of operation; patient was taken to the operating room and placed in the operating room table in the supine position following IV sedation the fourth and fifth right and fourth and fifth left toes were anesthetized with Sensorcaine with epinephrine and a small admixture of Decadron.  The feet are scrubbed prepped and draped in usual aseptic fashion.    Attention is directed to the fifth left toe a midline incision is made and the incision is deepened down carefully taking care to avoid neurovascular structures and cauterize any superficial bleeders.  The incision is deepened to the distal phalanx of the toe and the tendons are released and the head of the middle phalanx is delivered and transected the joint is then prepared and receives a hammertoe implant.  Good position of fixation is confirmed with fluoroscopy.  The area is irrigated and tissues were closed in layers with Vicryl and skin is coapted with nylon.  Attention is then directed to the fourth left toe and the exact same procedure was performed except for the proximal phalanx.    Attention is then directed to the fourth and fifth right toes and the same procedures are performed in the similar fashion good position is confirmed with fluoroscopy tissues were closed in layers with Vicryl and skin is coapted with nylon Adaptic 4 x 4's  and dry sterile compressive dressing were then applied the pneumatic ankle tourniquet which was previously inflated to 250 mmHg was then released there is good instantaneous blood flow to all digits.  Patient recovered uneventfully and postop instructions discussed with patient and family return to clinic in 1 week for follow-up.

## 2023-11-30 ENCOUNTER — GYNECOLOGY VISIT (OUTPATIENT)
Dept: OBGYN | Facility: CLINIC | Age: 63
End: 2023-11-30
Payer: MEDICARE

## 2023-11-30 VITALS — WEIGHT: 115 LBS | BODY MASS INDEX: 19.74 KG/M2

## 2023-11-30 DIAGNOSIS — Z01.419 WOMEN'S ANNUAL ROUTINE GYNECOLOGICAL EXAMINATION: ICD-10-CM

## 2023-11-30 DIAGNOSIS — R21 RASH: ICD-10-CM

## 2023-11-30 DIAGNOSIS — Z12.31 ENCOUNTER FOR SCREENING MAMMOGRAM FOR MALIGNANT NEOPLASM OF BREAST: ICD-10-CM

## 2023-11-30 PROCEDURE — G0101 CA SCREEN;PELVIC/BREAST EXAM: HCPCS | Performed by: OBSTETRICS & GYNECOLOGY

## 2023-11-30 RX ORDER — CLOBETASOL PROPIONATE 0.5 MG/G
1 OINTMENT TOPICAL 2 TIMES DAILY
Qty: 60 G | Refills: 0 | Status: SHIPPED | OUTPATIENT
Start: 2023-11-30

## 2023-11-30 RX ORDER — CLOBETASOL PROPIONATE 0.5 MG/G
1 OINTMENT TOPICAL 2 TIMES DAILY
Qty: 60 G | Refills: 0 | Status: SHIPPED | OUTPATIENT
Start: 2023-11-30 | End: 2023-11-30 | Stop reason: SDUPTHER

## 2023-11-30 ASSESSMENT — ENCOUNTER SYMPTOMS
GASTROINTESTINAL NEGATIVE: 1
CARDIOVASCULAR NEGATIVE: 1
MUSCULOSKELETAL NEGATIVE: 1
PSYCHIATRIC NEGATIVE: 1
RESPIRATORY NEGATIVE: 1
EYES NEGATIVE: 1
NEUROLOGICAL NEGATIVE: 1
CONSTITUTIONAL NEGATIVE: 1

## 2023-11-30 NOTE — PROGRESS NOTES
"GYN PROBLEM VISIT    CC:  No chief complaint on file.    HPI: Patient is a 63 y.o.  who complaints of a rash on her chest and and itching on her vulva for the past several weeks. She had presented to urgent care and had been prescribed a topical steroid which she reports has not been working. She stopped moisturizing the area in an attempt to \"dry it out\". She denies fever, chills, abnormal vaginal discharge. Interestingly, she is 63 and premenopausal. She has had labwork and an ultrasound which confirms this finding. We discussed that typically rashes that aren't on the vulva are best treated by a PCP and she was encouraged to schedule a follow up appointment with them. She is changing insurances tomorrow so a referral could not be placed today. She will send a message on [employer] if she desires the referral tomorrow.      ROS:   Review of Systems   Constitutional: Negative.    HENT: Negative.     Eyes: Negative.    Respiratory: Negative.     Cardiovascular: Negative.    Gastrointestinal: Negative.    Genitourinary: Negative.    Musculoskeletal: Negative.    Skin:  Positive for itching and rash.   Neurological: Negative.    Endo/Heme/Allergies: Negative.    Psychiatric/Behavioral: Negative.           PFSH:  I personally reviewed the past medical and surgical histories.     Social History     Tobacco Use    Smoking status: Never    Smokeless tobacco: Never   Vaping Use    Vaping Use: Never used   Substance Use Topics    Alcohol use: Yes     Comment: occ.    Drug use: Never       Social History     Substance and Sexual Activity   Sexual Activity Yes    Partners: Male        ALLERGIES / REACTIONS:  No Known Allergies                        PHYSICAL EXAMINATION:  Vital Signs:   Vitals:    23 0739   Weight: 115 lb     Body mass index is 19.74 kg/m².    Physical Exam  Vitals reviewed.   Constitutional:       Appearance: Normal appearance.   HENT:      Head: Normocephalic.   Eyes:      Extraocular Movements: " Extraocular movements intact.      Pupils: Pupils are equal, round, and reactive to light.   Cardiovascular:      Rate and Rhythm: Normal rate.   Pulmonary:      Effort: Pulmonary effort is normal.   Abdominal:      General: Abdomen is flat.      Palpations: Abdomen is soft.   Musculoskeletal:         General: Normal range of motion.      Cervical back: Normal range of motion.   Skin:     General: Skin is warm and dry.             Comments: Her chest was erythematous with excoriations between her breasts and up to her neck. She had similar erythema and excoriations on her vulva   Neurological:      General: No focal deficit present.      Mental Status: She is alert and oriented to person, place, and time.   Psychiatric:         Mood and Affect: Mood normal.         Behavior: Behavior normal.          ASSESSMENT AND PLAN:  63 y.o.    1. Women's annual routine gynecological examination    2. Encounter for screening mammogram for malignant neoplasm of breast  - MA-SCREENING MAMMO BILAT W/TOMOSYNTHESIS W/CAD; Future    3. Rash  - clobetasol (TEMOVATE) 0.05 % Ointment; Apply 1 Application topically 2 times a day.  Dispense: 60 g; Refill: 0    Plan:  The patient was counseled on skin care and it was recommended that she moisturize with a heavy hypoallergenic cream and avoid anything scented. A steroid ointment was prescribed to try instead of the cream on both her chest and vulva.   She was encouraged to follow up in one week with primary care. If needed I can place the referral tomorrow when her new insurance is active.   A screening mammogram was ordered  She states that she is up to date with pap screening, colonoscopy, and DXA scan     Colt Taylor M.D.  Obstetrics & Gynecology   [phone]  3:33 PM

## 2023-11-30 NOTE — PROGRESS NOTES
Np here today for GYN appt.  Phone # [phone] (home)   C/o rash in between breasts and legs, itchiness

## 2023-12-20 ENCOUNTER — HOSPITAL ENCOUNTER (OUTPATIENT)
Dept: RADIOLOGY | Facility: MEDICAL CENTER | Age: 63
End: 2023-12-20
Payer: MEDICAID

## 2024-01-18 ENCOUNTER — APPOINTMENT (OUTPATIENT)
Dept: RADIOLOGY | Facility: MEDICAL CENTER | Age: 64
End: 2024-01-18
Attending: OBSTETRICS & GYNECOLOGY
Payer: COMMERCIAL

## 2024-01-18 DIAGNOSIS — Z12.31 ENCOUNTER FOR SCREENING MAMMOGRAM FOR MALIGNANT NEOPLASM OF BREAST: ICD-10-CM

## 2024-01-18 PROCEDURE — 77067 SCR MAMMO BI INCL CAD: CPT

## (undated) DEVICE — GLOVE BIOGEL PI ORTHO SZ 8.5 PF LF (40/BX)

## (undated) DEVICE — BANDAGE ELASTIC 4 HONEYCOMB - 4"X5YD LF (20/CA)"

## (undated) DEVICE — LACTATED RINGERS INJ 1000 ML - (14EA/CA 60CA/PF)

## (undated) DEVICE — TUBE CONNECTING SUCTION - CLEAR PLASTIC STERILE 72 IN (50EA/CA)

## (undated) DEVICE — TOWEL STOP TIMEOUT SAFETY FLAG (40EA/CA)

## (undated) DEVICE — KIT  I.V. START (100EA/CA)

## (undated) DEVICE — TUBING CLEARLINK DUO-VENT - C-FLO (48EA/CA)

## (undated) DEVICE — SUTURE GENERAL

## (undated) DEVICE — BANDAGE ROLL STERILE BULKEE 4.5 IN X 4 YD (100EA/CA)

## (undated) DEVICE — PACK LOWER EXTREMITY - (2/CA)

## (undated) DEVICE — SENSOR OXIMETER ADULT SPO2 RD SET (20EA/BX)

## (undated) DEVICE — GLOVE BIOGEL PI ORTHO SZ 6 1/2 SURGICAL PF LF (40PR/BX)

## (undated) DEVICE — GOWN WARMING STANDARD FLEX - (30/CA)

## (undated) DEVICE — SHEET BILATERAL 76X120 - (12/CA)

## (undated) DEVICE — CANNULA O2 COMFORT SOFT EAR ADULT 7 FT TUBING (50/CA)

## (undated) DEVICE — NEEDLE NON SAFETY 27GA X 1-1/4 IN HYPO (100EA/BX)

## (undated) DEVICE — SET LEADWIRE 5 LEAD BEDSIDE DISPOSABLE ECG (1SET OF 5/EA)

## (undated) DEVICE — DRAPE 36X28IN RAD CARM BND BG - (25/CA) O

## (undated) DEVICE — CANISTER SUCTION RIGID RED 1500CC (40EA/CA)

## (undated) DEVICE — SUCTION INSTRUMENT YANKAUER BULBOUS TIP W/O VENT (50EA/CA)

## (undated) DEVICE — GLOVE SZ 7 BIOGEL PI MICRO - PF LF (50PR/BX 4BX/CA)

## (undated) DEVICE — SODIUM CHL IRRIGATION 0.9% 1000ML (12EA/CA)

## (undated) DEVICE — TOURNIQUET CUFF 18 X 3 ONE PORT DISP - STERILE (10/BX)

## (undated) DEVICE — CANISTER SUCTION 3000ML MECHANICAL FILTER AUTO SHUTOFF MEDI-VAC NONSTERILE LF DISP  (40EA/CA)

## (undated) DEVICE — Device

## (undated) DEVICE — SUTURE 5-0 VICRYL PLUS P-3 18 (36PK/BX)"

## (undated) DEVICE — GLOVE BIOGEL PI INDICATOR SZ 6.5 SURGICAL PF LF - (50/BX 4BX/CA)

## (undated) DEVICE — DRESSING 3X3 ADAPTIC GAUZE - (50EA/CT)

## (undated) DEVICE — SUTURE 5-0 ETHILON P-3 18 (12PK/BX)"

## (undated) DEVICE — GLOVE BIOGEL PI INDICATOR SZ 6.0 SURGICAL PF LF -(200PR/CA)

## (undated) DEVICE — MASK OXYGEN VNYL ADLT MED CONC WITH 7 FOOT TUBING  - (50EA/CA)

## (undated) DEVICE — BLADE SAW SMALL BONE 25.0 X 5.5 X 0.51MM